# Patient Record
Sex: FEMALE | Race: WHITE
[De-identification: names, ages, dates, MRNs, and addresses within clinical notes are randomized per-mention and may not be internally consistent; named-entity substitution may affect disease eponyms.]

---

## 2017-02-05 ENCOUNTER — HOSPITAL ENCOUNTER (EMERGENCY)
Dept: HOSPITAL 25 - UCEAST | Age: 57
Discharge: HOME | End: 2017-02-05
Payer: COMMERCIAL

## 2017-02-05 VITALS — DIASTOLIC BLOOD PRESSURE: 81 MMHG | SYSTOLIC BLOOD PRESSURE: 122 MMHG

## 2017-02-05 DIAGNOSIS — Z88.2: ICD-10-CM

## 2017-02-05 DIAGNOSIS — J32.9: Primary | ICD-10-CM

## 2017-02-05 PROCEDURE — 99212 OFFICE O/P EST SF 10 MIN: CPT

## 2017-02-05 PROCEDURE — G0463 HOSPITAL OUTPT CLINIC VISIT: HCPCS

## 2017-02-05 NOTE — UC
Psychiatric Complaint HPI





- HPI Summary


HPI Summary: 





sinus congestion/cough/green nasal drainage and sputum





- History Of Current Complaint


Chief Complaint: UCRespiratory


Stated Complaint: ASTHMA FEVER EAR PAIN CONGESTION


Time Seen by Provider: 02/05/17 11:24


Hx Obtained From: Patient


Hx Last Menstrual Period: 4/16/13


Pregnant?: No


Onset/Duration: Gradual Onset, Lasting Days, Still Present


Timing: Minutes - few days


Severity Initially: Mild


Severity Currently: Moderate


Aggravating Factor(s): Nothing


Alleviating Factor(s): Nothing


Associated Signs And Symptoms: Negative





- Allergies/Home Medications


Allergies/Adverse Reactions: 


 Allergies











Allergy/AdvReac Type Severity Reaction Status Date / Time


 


Sulfa Drugs Allergy  Shortness Verified 02/05/17 10:48





   of Breath  














PMH/Surg Hx/FS Hx/Imm Hx


Previously Healthy: No


Endocrine History Of: 


   Denies: Diabetes, Thyroid Disease


Cardiovascular History Of: 


   Denies: Cardiac Disorders, Hypertension


Respiratory History Of: Reports: Asthma


   Denies: COPD


GI/ History Of: 


   Denies: Ulcer


Cancer History Of: 


   Denies: Breast Cancer





- Surgical History


Surgical History: Yes


Surgery Procedure, Year, and Place: appY.  csection x3.  em.  acl-left.  

TUBAL





- Family History


Known Family History: Positive: Hypertension, Respiratory Disease





- Social History


Occupation: Employed Full-time


Lives: With Family


Alcohol Use: Occasionally


Substance Use Type: None


Smoking Status (MU): Never Smoked Tobacco





- Immunization History


Most Recent Influenza Vaccination: 2016


Most Recent Pneumonia Vaccination: 2016





Review of Systems


Constitutional: Chills, Fatigue


Skin: Negative


Eyes: Negative


ENT: Sore Throat, Ear Ache, Nasal Discharge


Respiratory: Cough


Cardiovascular: Negative


Gastrointestinal: Negative


Genitourinary: Negative


Motor: Negative


Neurovascular: Negative


Musculoskeletal: Negative


Neurological: Negative


Psychological: Negative


All Other Systems Reviewed And Are Negative: Yes





Physical Exam


Triage Information Reviewed: Yes


Appearance: Well-Appearing, No Pain Distress, Well-Nourished


Vital Signs: 


 Initial Vital Signs











Temp  99.3 F   02/05/17 10:43


 


Pulse  93   02/05/17 10:43


 


Resp  18   02/05/17 10:43


 


BP  122/81   02/05/17 10:43


 


Pulse Ox  100   02/05/17 10:43











Vital Signs Reviewed: Yes


Eye Exam: Normal


Eyes: Positive: Conjunctiva Clear


ENT Exam: Normal


ENT: Positive: Hearing grossly normal, Pharynx normal, Pharyngeal erythema, 

Nasal congestion, Nasal drainage.  Negative: TMs normal, Tonsillar swelling, 

Tonsillar exudate, Trismus, Muffled/hoarse voice


Dental Exam: Normal


Neck exam: Normal


Neck: Positive: Supple, Nontender, No Lymphadenopathy


Respiratory Exam: Normal


Respiratory: Positive: Chest non-tender, Lungs clear, Normal breath sounds, No 

respiratory distress, No accessory muscle use


Cardiovascular Exam: Normal


Cardiovascular: Positive: RRR, No Murmur, Pulses Normal, Brisk Capillary Refill


Musculoskeletal Exam: Normal


Musculoskeletal: Positive: Strength Intact, ROM Intact, No Edema


Neurological Exam: Normal


Neurological: Positive: Alert, Muscle Tone Normal


Psychological Exam: Normal


Skin Exam: Normal


Skin: Positive: rashes





Psych Complaint Course/Dx





- Course


Course Of Treatment: Augmentin, flonase increase fluids, follow with pcp re-

check prn





- Differential Dx/Diagnosis


Differential Diagnosis/HQI/PQRI: Other - sinusitis, uri , influenza,


Provider Diagnoses: Sinusitis





Discharge





- Discharge Plan


Condition: Stable


Disposition: HOME


Prescriptions: 


Amoxicillin/Clavulanate TAB* [Augmentin *] 875 mg PO BID #20 tab


Fluticasone NASAL SPRAY 50MCG* [Flonase NASAL SPRAY 50MCG*] 2 spray BOTH NARES 

DAILY #1 btl


Patient Education Materials:  Amoxicillin/Clavulanate Potassium (By mouth), 

Fluticasone (Into the nose)


Referrals: 


Romana Hughes MD [Primary Care Provider] - If Needed

## 2017-03-20 ENCOUNTER — HOSPITAL ENCOUNTER (EMERGENCY)
Dept: HOSPITAL 25 - ED | Age: 57
Discharge: HOME | End: 2017-03-20
Payer: COMMERCIAL

## 2017-03-20 VITALS — DIASTOLIC BLOOD PRESSURE: 67 MMHG | SYSTOLIC BLOOD PRESSURE: 105 MMHG
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
## 2017-03-20 DIAGNOSIS — J45.909: ICD-10-CM

## 2017-03-20 DIAGNOSIS — T78.40XA: Primary | ICD-10-CM

## 2017-03-20 DIAGNOSIS — Z88.2: ICD-10-CM

## 2017-03-20 PROCEDURE — 99282 EMERGENCY DEPT VISIT SF MDM: CPT

## 2017-03-20 PROCEDURE — 96375 TX/PRO/DX INJ NEW DRUG ADDON: CPT

## 2017-03-20 PROCEDURE — 96374 THER/PROPH/DIAG INJ IV PUSH: CPT

## 2017-03-20 NOTE — ED
Wellington LUNA,Karey, scribed for Elroy Linn MD on 03/20/17 at 0054 .





Allergic Reaction/Systemic





- HPI Summary


HPI Summary: 





This 57 y/o female presents to ED for possible allergic reaction and diffuse 

itchy rash this 2130 PM. She is seen actively scratching all over at time of 

initial evaluation. Pt reports that this episode feels similar to her previous 

allergic reaction to sulfa drugs, but denies taking any different medications 

that could have caused this reaction. Negative SOB, or difficulty swallowing. 

Children's benadryl PTA with little relief. PMHx includes asthma. 





Pt requests IV administration of antihistamine for faster relief. 








- History of Current Complaint


Hx Obtained From: Patient, Medical Records


Hx Last Menstrual Period: 4/16/13


Onset/Duration: Sudden Onset


Timing: Constant


Pain Intensity: 8


Pain Scale Used: 0-10 Numeric


Location: Diffuse


Aggravating Factor(s): Nothing


Alleviating Factor(s): Nothing


Associated Signs And Symptoms: Positive: Rash - itchy





- Allergies/Home Medications


Allergies/Adverse Reactions: 


 Allergies











Allergy/AdvReac Type Severity Reaction Status Date / Time


 


Sulfa Drugs Allergy  Shortness Verified 03/20/17 01:36





   of Breath  














PMH/Surg Hx/FS Hx/Imm Hx


Endocrine/Hematology History: 


   Denies: Hx Diabetes, Hx Thyroid Disease


Cardiovascular History: 


   Denies: Hx Hypertension


Respiratory History: Reports: Hx Asthma


   Denies: Hx Chronic Obstructive Pulmonary Disease (COPD)


GI History: 


   Denies: Hx Ulcer





- Cancer History


Hx Chemotherapy: No


Hx Radiation Therapy: No





- Surgical History


Surgery Procedure, Year, and Place: appY.  csection x3.  em.  acl-left.  

TUBAL


Infectious Disease History: No


Infectious Disease History: 


   Denies: Hx Clostridium Difficile, Hx Hepatitis, Hx Human Immunodeficiency 

Virus (HIV), Hx of Known/Suspected MRSA, Hx Shingles, Hx Tuberculosis, Hx Known/

Suspected VRE, Hx Known/Suspected VRSA, History Other Infectious Disease, 

Traveled Outside the [place] in Last 30 Days





- Family History


Known Family History: Positive: Hypertension, Respiratory Disease





- Social History


Lives: With Family


Alcohol Use: Occasionally


Substance Use Type: Reports: None


Hx Tobacco Use: No


Smoking Status (MU): Never Smoked Tobacco





Review of Systems


Negative: Fever


Negative: Other - difficulty swallowing


Negative: Shortness Of Breath


Positive: Rash - itchy rash


Negative: Anxious, Depressed


All Other Systems Reviewed And Are Negative: Yes





Physical Exam


Triage Information Reviewed: Yes


Vital Signs On Initial Exam: 


 Initial Vitals











Temp Pulse Resp BP Pulse Ox


 


 98.1 F   87   18   134/86   100 


 


 03/20/17 00:39  03/20/17 00:39  03/20/17 00:39  03/20/17 00:39  03/20/17 00:39











Vital Signs Reviewed: Yes


Appearance: Positive: Well-Appearing, No Pain Distress


Skin: Positive: Other - itchy rash diffuse across trunk. Pt is actively 

scratching.


Neck: Positive: Supple, Nontender


Respiratory/Lung Sounds: Positive: Clear to Auscultation, Breath Sounds Present

, Other - Airway patent


Musculoskeletal: Positive: Strength/ROM Intact


Neurological: Positive: Sensory/Motor Intact, Alert, Oriented to Person Place, 

Time


Psychiatric: Positive: Affect/Mood Appropriate


AVPU Assessment: Alert





Diagnostics





- Vital Signs


 Vital Signs











  Temp Pulse Resp BP Pulse Ox


 


 03/20/17 00:39  98.1 F  87  18  134/86  100














- Laboratory


Lab Statement: Any lab studies that have been ordered have been reviewed, and 

results considered in the medical decision making process.





Allergic Reaction Course/Dx





- Course


Assessment/Plan: IMROVED IN ED. NO AIRWAY INVOLVEMENT. DISCHARGE HOME STABLE.





- Diagnoses


Provider Diagnoses: 


 Allergic reaction








Discharge





- Discharge Plan


Condition: Stable


Disposition: HOME


Patient Education Materials:  Allergies (ED)


Referrals: 


Romana Hughes MD [Primary Care Provider] - 


Additional Instructions: 


FOLLOW UP WITH YOUR DOCTOR.


RETURN TO THE EMERGENCY DEPARTMENT FOR ANY WORSENING OF YOUR CONDITION OR 

QUESTIONS OR CONCERNS.





The documentation as recorded by the Wellington manning Soohyun accurately reflects the 

service I personally performed and the decisions made by me, Elroy Linn MD.

## 2018-02-25 ENCOUNTER — HOSPITAL ENCOUNTER (EMERGENCY)
Dept: HOSPITAL 25 - UCEAST | Age: 58
Discharge: HOME | End: 2018-02-25
Payer: COMMERCIAL

## 2018-02-25 VITALS — DIASTOLIC BLOOD PRESSURE: 73 MMHG | SYSTOLIC BLOOD PRESSURE: 107 MMHG

## 2018-02-25 DIAGNOSIS — Z88.2: ICD-10-CM

## 2018-02-25 DIAGNOSIS — J45.909: Primary | ICD-10-CM

## 2018-02-25 PROCEDURE — 99212 OFFICE O/P EST SF 10 MIN: CPT

## 2018-02-25 PROCEDURE — G0463 HOSPITAL OUTPT CLINIC VISIT: HCPCS

## 2018-02-25 NOTE — XMS REPORT
Lindsey Shields

 Created on:February 15, 2018



Patient:Lindsey Shields

Sex:Female

:1960

External Reference #:[id number]





Demographics







 Address  [address] [place] [address]

 

 Home Phone  [phone]

 

 Mobile Phone  [phone]

 

 Work Phone  [phone]

 

 Preferred Language  English

 

 Marital Status  Not  Or 

 

 Uatsdin Affiliation  Unknown

 

 Race  White

 

 Ethnic Group  Not  Or 









Author







 Organization  [facility]

 

 Address  [address] [place] [address]

 

 Phone  [phone]









Support







 Name  Relationship  Address  Phone

 

 Roderick Shields    Unavailable  [phone]









Care Team Providers







 Name  Role  Phone

 

 Shannon Hughes  Primary Care Physician  Unavailable









Payers







 Type  Date  Identification Numbers  Payment Provider  Subscriber

 

 Commercial  Effective:  Policy Number: [id number]  [place]andy  Lindsey Shields



   2010    h   Par  









 PayID: [id number]   [address]









 [place] [address]







Problems







 Date  Description  Provider  Status

 

 Onset: 2009  Endometriosis of uterus  Bea Carter M.D.  Active







Family History







 Date  Family Member(s)  Problem(s)  Comments

 

   Father  Melanoma  

 

   Father  Parkinson's Disease  

 

   Mother  Colon Cancer  

 

   Mother  Osteoporosis  

 

   Number of Children  3  

 

   First Son  A&amp;W  

 

   Second Son  A&amp;W  

 

   First Daughter  A&amp;W  

 

   Number of Siblings  Siblings: 2  

 

   First Brother  A&amp;W  

 

   First Sister  A&amp;W  

 

   Paternal Grandfather   due to Old Age  ()

 

   Paternal Grandmother  Rheumatoid Arthritis  

 

   Paternal Grandmother   due to Unknown Causes  ()

 

   Maternal Grandfather   due to Heart Disease  ()

 

   Maternal Grandmother   due to Heart Disease  ()







Social History







 Type  Date  Description  Comments

 

 Education    Highest level of education  



     completed is a master's  



     degree  

 

 Marital Status    Patient is   

 

 Living Situation    Lives with spouse  

 

 Diet    Diet is healthy and well  



     balanced  

 

 Sleep    Typically sleeps 7 hours a  



     night  

 

 Pets    Household pets include a dog  

 

 Occupation    Professor  AT , interior design

 

 Cigarette Use    Never smoked cigarettes  

 

 Alcohol    Drinks alcohol occasionally  

 

 Smoking    Patient has never smoked  

 

 Drug Use    Denies drug use  

 

 Daily Caffeine    Does not consume caffeine  

 

 Exercise Type/Frequency    Exercises regularly  



 Current      

 

 Seat Belt/Car Seat    Always uses a seat belt  

 

 Currently Active    The patient is currently  



     sexually active  

 

 Contraceptive Methods    Current methods of birth  



     control used include tubal  



     ligation  

 

 STD's    No STD history  







Allergies, Adverse Reactions, Alerts







 Date  Description  Reaction  Status  Severity  Comments

 

 2005  Sulfa    active    







Medications







 Medication  Date  Status  Form  Strength  Qnty  SIG  Indications  Ordering



                 Provider

 

 Progesterone  02/15/  Active  Capsules  100mg  90cap  1 by mouth    Ross ZHOU



 Micronized          s  every day    ELIER Mora

 

 Estradiol  02/15/  Active  Tablets  0.5mg  90tab  1 by mouth    Ross ZHOU



   2018        s  every day    ELIER Mora

 

 Levocetirizine  /  Active            Unknown



 Dihydrochloride  0000              

 

 Multivitamins  /  Active            Unknown



   0000              

 

 Singulair  /  Active            Unknown



   0000              

 

 Flovent Diskus  /  Active            Unknown



   0000              

 

 Zoloft  /  Active            Unknown



   0000              

 

                 

 

 Macrobid  /  Hx  Capsules  100mg  14cap  1 cap by    Katelyn



    -        s  mouth every    Jump,



   /          12 hours    ANP-C



             for 7 days    

 

 Combipatch  06/15/  Hx  Patches  0.05-0.14  24uni  Apply One    Katelyn



    -    Biweek  mg/Day  ts  Patch Twice    Jump,



   02/15/          Weekly    ANP-C



                 

 

 Combipatch  /  Hx  Patches  0.05-0.14  24uni  change 2x    Katelyn



    -    Biweek  mg/Day  ts  qw    Jump,



   /              ANP-C



                 

 

 Diflucan  /  Hx  Tablets  150mg  2tabs  1 by mouth    Katelyn



    -          times 1 day    Jump,



   /          can repeat    ANP-C



             in 48 hour    



             if needed    

 

 Prempro  /  Hx  Tablets  0.45-1.5m  84tab  take 1  N95.1  Katelyn



    -      g  s  tablet by    Jump,



   /          mouth one    ANP-C



             time daily    

 

 Prempro  10/22/  Hx  Tablets  0.45-1.5m  84tab  take 1  N95.1  Katelyn



    -      g  s  tablet by    Jump,



   /          mouth one    ANP-C



   2016          time daily    

 

 Metrogel-Vaginal  /  Hx  Gel  0.75%  1TX  1    Katelyn



    -          applicator    Jump,



   /          every night    ANP-C



             at bedtime    



             x 5 days    

 

 Diflucan  /  Hx  Tablets  150mg  1tabs  1 by mouth  616.10  Katelyn



   2015 -          times 1 day    Jump,



   /              ANP-C



                 

 

 Nystatin-Triamcin  /  Hx  Cream  295331-3.  30gra  apply three  616.10  
Katelyn



 olone  2015 -      1Unit/GM-  m  times a day    Jump,



   /      %    to vulva x    ANP-C



             5 days and    



             as needed    

 

 Prometrium  /  Hx  Capsules  100mg  90cap  take one    Katelyn



    -        s  capsule by    Jump,



   /          mouth at    ANP-C



             bedtime    

 

 Prometrium  /  Hx  Capsules  100mg  1caps  needs to    Katelyn



   2013 -          call office    Jump,



   /              ANP-C



                 

 

 Vagifem  10/15/  Hx  Tablets  10mcg  24tab  use 1  627.3  Katelyn



    -        s  tablet 2x    Jump,



   /          qw    ANP-C



                 

 

 Primrose  10/09/  Hx            Katelyn



   2013 -              Jump,



   /              ANP-C



                 

 

 Diflucan  /  Hx  Tablets  150mg  1tabs  1 po times    Katelyn



   2012 -          1 day    Jump,



   /              ANP-C



                 

 

 Macrobid  /  Hx  Capsules  100mg  10cap  1 po bid  599.89  Katelyn



    -        s      Jump,



   /              ANP-C



                 

 

 Ortho Tri-Cyclen  /  Hx  Tablets    1PK      CECILIA Magana   -              Rebekah



   /              ELIRE CALL



                 

 

 Detrol La  /  Hx  Caps ER  4mg  30cap  1 PO qd    CECILIA Ash



    -    24HR    s      Rebekah



   /              ELIER CALL



                 

 

 Macrobid  /  Hx  Capsules  100mg  10cap  1 PO bid    CECILIA Ash



    -        s      Rebekah



   /              ELIER CALL



                 

 

 Xyzal  /00/  Hx            Unknown



   2012              

 

 Flovent HFA  /00/  Hx            Unknown



   2012              

 

 Black Cohosh  /00/  Hx            Unknown



   2016              

 

 Patanase  00/00/  Hx            Unknown



    -              



   10/09/              



   2012              

 

 Doxycycline  /00/  Hx            Unknown



    -              



   2011              

 

 Dulera  /00/  Hx        prn    Unknown



   2015              

 

 Fish Oil  00/00/  Hx            Unknown



   2014              

 

 Progesterone  /  Hx            Unknown



   2016              

 

 Vitamin B Complex  /  Hx            Unknown



   2016              

 

 Menopause Formula  /  Hx            Unknown



   2016              







Vital Signs







 Date  Vital  Result  Comment

 

 02/15/2018  BP Systolic  116 mmHg  









 BP Diastolic  70 mmHg  

 

 Height  63.5 inches  5'3.50"

 

 Weight  122.00 lb  

 

 BMI (Body Mass Index)  21.3 kg/m2  

 

   4  

 

 Parity  3  









 10/31/2017  BP Systolic  104 mmHg  









 BP Diastolic  68 mmHg  

 

 Body Temperature  97.8 F  

 

 Height  63.5 inches  5'3.50"

 

 Weight  127.00 lb  

 

 BMI (Body Mass Index)  22.1 kg/m2  

 

 Last Menstrual Period  8198631  

 

   4  

 

 Parity  3  









 2017  BP Systolic  108 mmHg  









 BP Diastolic  70 mmHg  

 

 Height  63.5 inches  5'3.50"

 

 Weight  125.00 lb  

 

 BMI (Body Mass Index)  21.8 kg/m2  

 

 Last Menstrual Period  5499917  

 

   4  

 

 Parity  3  









 2017  BP Systolic  110 mmHg  









 BP Diastolic  68 mmHg  

 

 Height  63.5 inches  5'3.50"

 

 Weight  125.00 lb  

 

 BMI (Body Mass Index)  21.8 kg/m2  

 

 Last Menstrual Period  6838219  

 

   4  

 

 Parity  3  









 2017  BP Systolic  128 mmHg  









 BP Diastolic  72 mmHg  

 

 Height  63.5 inches  5'3.50"

 

 Weight  124.00 lb  

 

 BMI (Body Mass Index)  21.6 kg/m2  

 

 Last Menstrual Period  5939804  

 

   4  

 

 Parity  3  









 2016  BP Systolic  100 mmHg  









 BP Diastolic  76 mmHg  

 

 Height  63.5 inches  5'3.50"

 

 Weight  130.00 lb  

 

 BMI (Body Mass Index)  22.7 kg/m2  

 

 Last Menstrual Period  9722622  

 

   4  

 

 Parity  3  









 2016  BP Systolic  116 mmHg  









 BP Diastolic  82 mmHg  

 

 Height  63.5 inches  5'3.50"

 

 Weight  130.00 lb  

 

 BMI (Body Mass Index)  22.7 kg/m2  

 

 Last Menstrual Period  7737253  

 

   4  

 

 Parity  3  









 2016  BP Systolic  122 mmHg  









 BP Diastolic  70 mmHg  

 

 Height  63.5 inches  5'3.50"

 

 Weight  128.00 lb  

 

 BMI (Body Mass Index)  22.3 kg/m2  

 

 Last Menstrual Period  2775164  

 

   4  

 

 Parity  3  









 10/22/2015  BP Systolic  106 mmHg  









 BP Diastolic  66 mmHg  

 

 Height  63.5 inches  5'3.50"

 

 Weight  126.00 lb  

 

 BMI (Body Mass Index)  22.0 kg/m2  

 

   4  

 

 Parity  3  









 2015  BP Systolic  104 mmHg  









 BP Diastolic  66 mmHg  

 

 Height  63.50 inches  5'3.50"

 

 Weight  126.00 lb  

 

 BMI (Body Mass Index)  22.0 kg/m2  

 

 Last Menstrual Period  3712053  

 

   4  

 

 Parity  3  









 10/20/2014  BP Systolic  102 mmHg  









 BP Diastolic  70 mmHg  

 

 Height  63.50 inches  5'3.50"

 

 Weight  122.00 lb  

 

 BMI (Body Mass Index)  21.3 kg/m2  

 

 Last Menstrual Period  5424483  

 

   4  

 

 Parity  3  









 2014  BP Systolic  112 mmHg  









 BP Diastolic  72 mmHg  

 

 Height  63 inches  5'3"

 

 Weight  122.00 lb  

 

 BMI (Body Mass Index)  21.6 kg/m2  

 

 Last Menstrual Period  3844849  

 

   4  

 

 Parity  3  









 2013  BP Systolic  110 mmHg  









 BP Diastolic  70 mmHg  

 

 Height  63 inches  5'3"

 

 Weight  122.00 lb  

 

 BMI (Body Mass Index)  21.6 kg/m2  

 

 Last Menstrual Period  6317985  

 

   4  

 

 Parity  3  









 10/15/2013  BP Systolic  118 mmHg  









 BP Diastolic  76 mmHg  

 

 Height  63 inches  5'3"

 

 Weight  125.00 lb  

 

 BMI (Body Mass Index)  22.1 kg/m2  

 

 Last Menstrual Period  4220309  

 

   4  

 

 Parity  3  









 10/09/2013  BP Systolic  122 mmHg  









 BP Diastolic  70 mmHg  

 

 Height  63 inches  5'3"

 

 Weight  123.00 lb  

 

 BMI (Body Mass Index)  21.8 kg/m2  

 

 Last Menstrual Period  6499281  

 

   4  

 

 Parity  3  









 2012  BP Systolic  100 mmHg  









 BP Diastolic  60 mmHg  

 

 Height  63 inches  5'3"

 

 Weight  121.00 lb  

 

 BMI (Body Mass Index)  21.4 kg/m2  

 

 Last Menstrual Period  2533653  

 

   4  

 

 Parity  3  









 2012  BP Systolic  112 mmHg  









 BP Diastolic  74 mmHg  

 

 Height  63 inches  5'3"

 

 Weight  118.00 lb  

 

 BMI (Body Mass Index)  20.9 kg/m2  

 

 Last Menstrual Period  5810385  

 

   4  

 

 Parity  3  









 2012  BP Systolic  100 mmHg  









 BP Diastolic  60 mmHg  

 

 Height  63 inches  5'3"

 

 Weight  119.00 lb  

 

 BMI (Body Mass Index)  21.1 kg/m2  

 

 Last Menstrual Period  1194037  

 

   4  

 

 Parity  3  









 2012  BP Systolic  120 mmHg  









 BP Diastolic  72 mmHg  

 

 Height  62.75 inches  5'2.75"

 

 Weight  122.00 lb  

 

 BMI (Body Mass Index)  21.8 kg/m2  

 

 Last Menstrual Period  2046150  

 

   4  

 

 Parity  3  









 10/24/2011  BP Systolic  98 mmHg  









 BP Diastolic  58 mmHg  

 

 Height  63 inches  5'3"

 

 Weight  130.00 lb  

 

 BMI (Body Mass Index)  23.0 kg/m2  









 2011  BP Systolic  100 mmHg  









 BP Diastolic  70 mmHg  

 

 Height  63 inches  5'3"

 

 Weight  124.00 lb  

 

 BMI (Body Mass Index)  22.0 kg/m2  

 

 Last Menstrual Period  7956348  









 2010  BP Systolic  120 mmHg  









 BP Diastolic  60 mmHg  

 

 Height  63 inches  5'3"

 

 Weight  120.00 lb  

 

 BMI (Body Mass Index)  21.3 kg/m2  

 

 Last Menstrual Period  5856872  

 

   4  

 

 Parity  3  









 2009  BP Systolic  102 mmHg  









 BP Diastolic  62 mmHg  

 

 Height  63.5 inches  5'3.50"

 

 Weight  123.00 lb  

 

 BMI (Body Mass Index)  21.4 kg/m2  

 

 Last Menstrual Period  4001156  

 

   4  

 

 Parity  3  









 2008  BP Systolic  110 mmHg  









 BP Diastolic  72 mmHg  

 

 Height  63.5 inches  5'3.50"

 

 Weight  126.00 lb  

 

 BMI (Body Mass Index)  22.0 kg/m2  

 

 Last Menstrual Period  0589958  









 2006  BP Systolic  90 mmHg  









 BP Diastolic  60 mmHg  

 

 Height  63.5 inches  5'3.50"

 

 Weight  123.00 lb  

 

 BMI (Body Mass Index)  21.4 kg/m2  









 2006  BP Systolic  98 mmHg  









 BP Diastolic  68 mmHg  

 

 Height  63.5 inches  5'3.50"

 

 Last Menstrual Period  2665825  









 2006  BP Systolic  90 mmHg  









 BP Diastolic  60 mmHg  

 

 Height  63.5 inches  5'3.50"

 

 Weight  124.00 lb  

 

 BMI (Body Mass Index)  21.6 kg/m2  

 

 Last Menstrual Period  1006417  

 

   4  

 

 Parity  3  







Results







 Test  Date  Test  Result  H/L  Range  Note

 

 Urine Culture And  10/31/2017  Urine Culture  SEE RESULT BELOW      1



 Sensitivities            

 

 Laboratory test finding  2017  Surgical Pathology  SEE RESULT BELOW      
2

 

 Laboratory test finding  2017  Cytology  SEE RESULT BELOW      3









 Human Papilloma Virus Rna  Negative    Negative  4









 Laboratory test finding  2016  Culture Genital &amp;  SEE RESULT BELOW  
  yeast  5



     Sensitivity        

 

 Laboratory test finding  10/22/2015  Cytology  SEE RESULT BELOW      6









 Human Papilloma Virus Rna  Negative    Negative  7









 Laboratory test finding  2015  Culture Genital &amp;  SEE RESULT BELOW  
  BV  8



     Sensitivity        

 

 Thinprep W/Reflex HR HPV  10/20/2014  ThinPrep Pap Test reflex  (SEE NOTE)    
  9



 If Asc-US    HPV if Asc-US        









 ThinPrep Pap Test reflex HPV if Asc-US  SEE IMAGE      









 Thyroid Function Cascade  2013  Thyroid Stim Hormone  3.0 mIU/L    0.3-
5.0  10

 

 Laboratory test finding  2013  Follicle Stimulating  9.43 miu/mL      11



     Hormone        









 Luteinizing Hormone  3.28 miu/mL      12









 Laboratory test  2013  Surgical Pathology  RUN DATE: 11/11/    normal  13



 finding      &lt;SEE NOTE&gt;      

 

 Laboratory test  10/15/2013  Genital Culture  (SEE NOTE)      14



 finding            

 

 Human Papilloma  10/10/2013  Human Papillomavirus  See Comment      15



     Source        









 Human Papillomavirus High Risk  Negative    Negative  16









 Laboratory test  10/09/2013  Cytology  RUN DATE: 10/10/    neg/  17



 finding      &lt;SEE NOTE&gt;      

 

 Laboratory test  2012  Genital Culture  ----------------      18



 finding      &lt;SEE NOTE&gt;      

 

 Laboratory test  2011  Cytology  ----------------      19



 finding      &lt;SEE NOTE&gt;      

 

 Laboratory test  2010  Cytology  ----------------      20



 finding      &lt;SEE NOTE&gt;      

 

 Laboratory test  2009  Cytology  ----------------      21



 finding      &lt;SEE NOTE&gt;      

 

 Pathology  2006  Surgical Pathology  ----------------      22



       &lt;SEE NOTE&gt;      

 

 Pap Smear RFX High  2006  Diagnosis:  COMMENT      23, 24



 Risk To HPV            









 Specimen adequacy:  COMMENT      23, 25

 

 Clinician provided Icd9:  COMMENT      23, 26

 

 Performed by:  COMMENT      23, 27

 

 QC reviewed by:  COMMENT      23, 28

 

 .  .      23

 

 Note:  COMMENT      23, 29

 

 .  COMMENT      23, 30

 

 Chlamydia, Nuc. Acid Amp  Negative    Negative  23

 

 Gonococcus, Nuc. Acid Amp  Negative    Negative  23









 1  SEE RESULT BELOW



   -----------------------------------------------------------------------------
---------------



   Name:  LINDSEY SHIELDS                : 1960    Attend Dr: [facility] [place]



   Acct:  [id number]  Unit: [id number]  AGE: 57            Location:  [facility]



   Reg:   10/31/17                        SEX: F             Status:    REG REF



   -----------------------------------------------------------------------------
---------------



   



   SPEC: [id number]         MILAGROS:   10/31/    [facility] DR: [facility]



   REQ:  [id number]              RECD:   10/31/



   STATUS: COMP



   _



   SOURCE: URINE          SPDESC:



   ORDERED:  Urine Culture



   COMMENTS: [id number]



   Urine Source: Random



   



   -----------------------------------------------------------------------------
---------------



   Procedure                         Result                         Reported   
        Site



   -----------------------------------------------------------------------------
---------------



   Urine Culture  Final                                             17-
0825      ML



   



   Organism 1                     ESCHERICHIA COLI



   [place] Count                &gt;100,000 (Many) CFU/ML



   



   1. ESCHERICHIA COLI



   M.I.C.      RX



   --------- ------



   Ampicillin                     &lt;=2         S



   Cefazolin                      &lt;=4         S



   Cefepime                       &lt;=1         S



   Ceftriaxone                    &lt;=1         S



   Ciprofloxacin                  &lt;=0.25      S



   Gentamicin                     &lt;=1         S



   Levofloxacin                   &lt;=0.12      S



   Meropenem                      &lt;=0.25      S



   Nitrofurantoin                 &lt;=16        S



   Tetracycline                   &lt;=1         S



   Pipercillin/Tazobactam         &lt;=4         S



   Trimethoprim/Sulfamethoxazole  &lt;=20        S



   Amoxicillin/Clavulanic Acid    &lt;=2         S



   Aztreonam                      &lt;=1         S



   



   



   Contact the Microbiology Department for any additional



   antibiotic reporting.



   



   -----------------------------------------------------------------------------
---------------



   * ML - MAIN LAB ([facility])



   .



   



   ** END OF REPORT **



   



   * ML=Testing performed at Main Lab



   DEPARTMENT OF PATHOLOGY,  [address] [place] [address]



   Phone # [phone]      Fax #[phone]



   Everett Conde M.D. Director     [facility] # [id number]

 

 2  SEE RESULT BELOW



   -----------------------------------------------------------------------------
---------------



   Name:  LINDSEY SHIELDS                : 1960    Attend Dr: Katelyn LEDESMA



   Acct:  [id number]  Unit: [id number]  AGE: 56            Location:  [facility]



   Re17                        SEX: F             Status:    REG REF



   -----------------------------------------------------------------------------
---------------



   



   SPEC: [id number]             MILAGROS:       [facility] DR: Katelyn LEDESMA



   REQ:  [id number]             RECD: 



   STATUS: SOUT



   _



   ORDERED:  LEVEL 4



   COMMENTS: [id number]



   



   FINAL DIAGNOSIS



   



   



   Uterus, endometrium, biopsy:



   -- Inactive endometrial mucosa.



   -- No evidence of hyperplasia or malignancy.



   



   



   



   PRE-OPERATIVE DIAGNOSIS



   



   Dysfunctional uterine bleeding.



   



   GROSS DESCRIPTION



   



   The specimen is received in formalin labeled, EM BX, and consists of a 0.6 x 
0.4 x 0.1 cm



   aggregate of tan-pink irregular soft tissue fragments and red-brown blood 
clot.  The



   specimen is filtered and submitted entirely in one cassette.



   



   Signed __________(signature on file)___________ Shannon Kan MD  [id number]



   



   -----------------------------------------------------------------------------
---------------



   



   



   



   



   



   



   



   



   



   



   



   



   



   



   ** END OF REPORT **



   



   * ML=Testing performed at Main Lab



   DEPARTMENT OF PATHOLOGY,  [address] [place] [address]



   Phone # [phone]      Fax #[phone]



   Everett Conde M.D. Director     [facility] # [id number]

 

 3  SEE RESULT BELOW



   -----------------------------------------------------------------------------
---------------



   Name:  LINDSEY SHIELDS                : 1960    Attend Dr: Katelyn LEDESMA



   Acct:  [id number]  Unit: [id number]  AGE: 56            Location:  [facility]



   Re17                        SEX: F             Status:    REG REF



   -----------------------------------------------------------------------------
---------------



   



   SPEC: [id number]            MILAGROS:       SUBM DR: Katelyn LEDESMA



   REQ:  [id number]             RECD: 



   STATUS: SOUT



   _



   ORDERED:  TP IMAGE ANAL, HPV/Thin Prep



   COMMENTS: [id number]



   



   FINAL DIAGNOSIS



   



   Negative for Intraepithelial lesion or Malignancy



   A. Ectocervical/Endocervical



   Specimen Adequacy:



   Satisfactory of evaluation



   Transformation zone component identified



   Patient Information:



   HPV: High risk HPV RNA testing regardless of pap results.



   Actual Specimen Date:         17



   Last Menstrual Date:          17



   Date of Last Specimen:        10/22/15



   



   -----------------------------------------------------------------------------
---------------



   Date     Time Test            Result   Flag (u) Normal Range



   17 0953 HPV RNA         Negative          Negative



   



   The high-risk HPV types detected by the assay include: 16,



   18, 31, 33, 35, 39, 45, 51, 52, 56, 58, 59, 66, and 68.



   -----------------------------------------------------------------------------
---------------



   



   



   Signed __________(signature on file)___________ ALEXEI Greene (ASC)  4516



   



   This Pap test was evaluated with the assistance of the [employer] Test Imaging 
System. Due to



   cytologic findings at the imager microscope, comprehensive manual 
rescreening by a



   Cytotechnologist may be required.



   The Pap Smear is a screening test designed to aid in the detection of 
premalignant and



   malignant conditions of the uterine cervix.  It is not a diagnostic 
procedure and should



   not be used as the sole means of detecting cervical cancer.  Both false-
positive and false-



   negative reports do occur.  Depending on your risk status, a Pap smear 
should be obtained



   and evaluated every 1-3 years.



   



   -----------------------------------------------------------------------------
---------------



   



   ** END OF REPORT **



   



   * ML=Testing performed at Main Lab



   DEPARTMENT OF PATHOLOGY,  [address] [place] [address]



   Phone # [phone]      Fax #[phone]



   Everett Conde M.D. Director     [facility] # [id number]

 

 4  The high-risk HPV types detected by the assay include: 16,



   18, 31, 33, 35, 39, 45, 51, 52, 56, 58, 59, 66, and 68.

 

 5  SEE RESULT BELOW



   -----------------------------------------------------------------------------
---------------



   Name:  MIKE SHIELDSA R                : 1960    Attend Dr: Katelyn Key CNP



   Acct:  [id number]  Unit: [id number]  AGE: 55            Location:  [facility]



   Re16                        SEX: F             Status:    REG REF



   -----------------------------------------------------------------------------
---------------



   



   SPEC: [id number]         MILAGROS:       [facility] DR: Katelyn Key 
CNP



   REQ:  [id number]              RECD:   



   STATUS: COMP



   _



   SOURCE: CERVIX         SPDESC:



   ORDERED:  Genital Culture



   



   -----------------------------------------------------------------------------
---------------



   Procedure                         Result                         Reported   
        Site



   -----------------------------------------------------------------------------
---------------



   Genital Culture  Final                                           16-
1129      ML



   



   Organism 1                     YEAST



   Quantity                    3+



   Organism 2                     NORMAL ROBERT



   Quantity                    1+



   



   Routine genital cultures do not include selective agar for



   Neisseria gonorrhoeae. Molecular testing offers better test



   sensitivity and therefore is the preferred test methodology



   for identifying this organism.



   



   -----------------------------------------------------------------------------
---------------



   * ML - [facility] ([facility])



   .



   



   



   



   



   



   



   



   



   



   



   



   



   



   



   



   



   



   ** END OF REPORT **



   



   * ML=Testing performed at Main Lab



   DEPARTMENT OF PATHOLOGY,  [address] [place] [address]



   Phone # [phone]      Fax #[phone]



   Everett Conde M.D. Director     [facility] # [id number]

 

 6  SEE RESULT BELOW



   -----------------------------------------------------------------------------
---------------



   Name:  LINDSEY SHIELDS                : 1960    Attend Dr: Katelyn Key CNP



   Acct:  [id number]  Unit: [id number]  AGE: 55            Location:  [facility]



   Reg:   10/22/15                        SEX: F             Status:    REG REF



   -----------------------------------------------------------------------------
---------------



   



   SPEC: [id number]            MILAGROS: 10/22/      SUBM DR: Katelyn Key 
CNP



   REQ:  [id number]             RECD: 10/22/



   STATUS: SOUT



   _



   ORDERED:  IMAGE ANALYSIS, HPV/Thin Prep



   



   FINAL DIAGNOSIS



   



   Negative for Intraepithelial lesion or Malignancy



   A. Ectocervical/Endocervical



   Specimen Adequacy:



   Satisfactory of evaluation



   Transformation zone component identified



   Patient Information:



   HPV: High risk HPV RNA testing regardless of pap results.



   Actual Specimen Date:         10/22/15



   LMP If Unknown:               2015



   Date of Last Specimen:        10/20/14



   



   -----------------------------------------------------------------------------
---------------



   Date     Time Test            Result   Flag (u) Normal Range



   10/22/15 1009 HPV RNA         Negative          Negative



   



   The high-risk HPV types detected by the assay include: 16,



   18, 31, 33, 35, 39, 45, 51, 52, 56, 58, 59, 66, and 68.



   -----------------------------------------------------------------------------
---------------



   



   



   Signed __________(signature on file)___________ ALEXEI Greene (ASC) 10/23
/15 1309



   



   This Pap test was evaluated with the assistance of the [employer] Test Imaging 
System. Due to



   cytologic findings at the imager microscope, comprehensive manual 
rescreening by a



   Cytotechnologist may be required.



   The Pap Smear is a screening test designed to aid in the detection of 
premalignant and



   malignant conditions of the uterine cervix.  It is not a diagnostic 
procedure and should



   not be used as the sole means of detecting cervical cancer.  Both false-
positive and false-



   negative reports do occur.  Depending on your risk status, a Pap smear 
should be obtained



   and evaluated every 1-3 years.



   



   -----------------------------------------------------------------------------
---------------



   



   



   ** END OF REPORT **



   



   * ML=Testing performed at Main Lab



   DEPARTMENT OF PATHOLOGY,  [address] [place] [address]



   Phone # [phone]      Fax #[phone]



   Everett Conde M.D. Director     [facility] # [id number]

 

 7  The high-risk HPV types detected by the assay include: 16,



   18, 31, 33, 35, 39, 45, 51, 52, 56, 58, 59, 66, and 68.

 

 8  SEE RESULT BELOW



   -----------------------------------------------------------------------------
---------------



   Name:  LINDSEY SHIELDS                : 1960    Attend Dr: Katelyn Key CNP



   Acct:  [id number]  Unit: [id number]  AGE: 54            Location:  [facility]



   Re/20/15                        SEX: F             Status:    REG REF



   -----------------------------------------------------------------------------
---------------



   SPEC: [id number]         MILAGROS:   08/20/    SUBM DR: Katelyn Key 
CNP



   REQ:  [id number]              RECD:   08/20/



   STATUS: COMP



   _



   SOURCE: VAGINAL        SPDESC:



   ORDERED:  Genital Culture



   -----------------------------------------------------------------------------
---------------



   Procedure                         Result                         Verified   
        Site



   -----------------------------------------------------------------------------
---------------



   Genital Culture  Final                                           08/22/15-
1049      ML



   Organism 1                     [place]ST[place] VAGINALIS - PRESUMPTIVE



   Quantity                    3+



   Organism 2                     NORMAL ROBERT



   Quantity                    2+



   Routine genital cultures do not include selective agar for



   Neisseria gonorrhoeae. Molecular testing offers better test



   sensitivity and therefore is the preferred test methodology



   for identifying this organism.



   -----------------------------------------------------------------------------
---------------



   * ML - MAIN LAB ([facility])



   .



   ** END OF REPORT **



   * ML=Testing performed at Main Lab



   DEPARTMENT OF PATHOLOGY,  [address] [place] [address]



   Phone # [phone]      Fax #[phone]



   Everett Conde M.D. Director     [facility] # [id number]

 

 9  ---------------------SPECIMEN PART-------------------



   A. Cervical, Endocervical, ThinPrep Pap (Imager)



   ---------------------CYTOLOGY HX---------------------



   Date of Last Menstrual Period: 10/12/14



   Other Information:Clinical Diagnosis Code: V76.2



   Previous Normal Pap: 10/9/13



   ---------------------FINAL DIAGNOSIS-----------------



   INTERPRETATION:   Negative for Intraepithelial Lesion or



   Malignancy.



   



   SPECIMEN ADEQUACY:Satisfactory for evaluation.



   Endocervical/transformation zone component present.

 

 10  Test Performed by:



   [address] [address]



   : Yadiel Lyman III, M.D.

 

 11  Normally menstruating females



   - Follicular phase                 3 - 9



   - Mid-cycle peak                   4 - 23



   - Luteal phase                     1 - 6



   Postmenopausal females            16 - 114

 

 12  Normally menstruating females



   - Follicular Phase                    1 - 18



   - Mid-Cycle Peak                     24 - 105



   - Luteal Phase                      0.6 - 20



   Postmenopausal females               15 - 62

 

 13  RUN DATE: 13               [facility] **LIVE**       
         PAGE    1



   RUN TIME: 8664             [address] [place] [address]   [address]



   Specimen Inquiry



   



   -----------------------------------------------------------------------------
---------------



   Name:  LINDSEY SHIELDS                : 1960    Attend Dr: Katelyn Key CNP



   Acct:  [id number]  Unit: [id number]  AGE: 53            Location:  [facility]



   Re13                        SEX: F             Status:    REG REF



   -----------------------------------------------------------------------------
---------------



   



   SPEC: [id number]             MILAGROS: 13-41      SUBM DR: Katelyn Key 
CNP



   REQ:  [id number]             RECD: 



   STATUS: SOUT



   _



   ORDERED:  LEVEL IV



   



   FINAL DIAGNOSIS



   



   Uterus, endometrium, curettage:



   A. Weakly proliferative endometrium with extensive glandular and stromal 
breakdown.



   B. No evidence of hyperplasia or neoplasia identified.



   



   



   



   



   



   



   PRE-OPERATIVE DIAGNOSIS



   



   Dysfunctional uterine bleeding



   



   GROSS DESCRIPTION



   



   The specimen is received in formalin labeled Lindsey Shields, Undesignated 
and consists of



   



   multiple portions of tan-red, focally mucoid tissue that is aggregate 
measures 0.3 x 0.3 x



   0.3 cm.  Submitted entirely, one cassette.



   



   



   Signed __________(signature on file)___________ Everett Conde MD  [id number]



   



   -----------------------------------------------------------------------------
---------------



   



   



   



   



   



   



   



   



   



   



   



   ** END OF REPORT **



   



   * ML=Testing performed at Main Lab



   DEPARTMENT OF PATHOLOGY,  [facility] [place], [place] [address]



   Phone # [phone]      Fax #[phone]



   Everett Conde M.D. Director     [place] Permit  #[id number]

 

 14  RUN DATE: 10/18/13               [facility] **LIVE**       
         PAGE    1



   RUN TIME: 912             [facility] [employer] [place], [place]   [address]



   Specimen Inquiry



   



   -----------------------------------------------------------------------------
---------------



   Name:  LINDSEY SHIELDS                : 1960    Attend Dr: Katelyn Key CNP



   Acct:  [id number]  Unit: [id number]  AGE: 52            Location:  [facility]



   Reg:   10/15/13                        SEX: F             Status:    REG REF



   -----------------------------------------------------------------------------
---------------



   



   SPEC: [id number]         MILAGROS:   10/15/13-    SUBM DR: Katelyn Key CNP



   REQ:  [id number]              RECD:   10/16/



   STATUS: COMP



   _



   SOURCE: CERVIX         SPDESC:



   ORDERED:  Genital Culture



   QUERIES:  [employer] Number [id number]



   



   -----------------------------------------------------------------------------
---------------



   Procedure                         Result                         Verified   
        Site



   -----------------------------------------------------------------------------
---------------



   Genital Culture  Final                                           10/18/13-
912      ML



   No Growth Day 2



   



   -----------------------------------------------------------------------------
---------------



   



   



   



   



   



   



   



   



   



   



   



   



   



   



   



   



   



   



   



   



   



   



   



   



   



   



   



   



   ** END OF REPORT **



   



   * ML=Testing performed at Main Lab



   DEPARTMENT OF PATHOLOGY,  [facility] [place], [place] [address]



   Phone # [phone]      Fax #[phone]



   Everett Conde M.D. Director     [place] Permit  #[id number]

 

 15  RESULT: Ectocervical/Endocervical

 

 16  For types 16, 18, 31, 33, 35, 39, 45, 51, 52, 56, 58, 59



   and 68.



   Test Performed by:



   [address] [address]



   : Yadiel Lyman III, M.D.

 

 17  RUN DATE: 10/10/13               [facility] **LIVE**       
         PAGE    1



   RUN TIME: 1217             [address] [place] [address]   [address]



   Specimen Inquiry



   



   -----------------------------------------------------------------------------
---------------



   Name:  LINDSEY SHIELDS                : 1960    Attend Dr: Katelyn Key CNP



   Acct:  [id number]  Unit: [id number]  AGE: 52            Location:  [facility]



   Reg:   10/09/13                        SEX: F             Status:    REG REF



   -----------------------------------------------------------------------------
---------------



   



   SPEC: [id number]            MILAGROS: 10/09/      SUBM DR: Katelyn Key 
CNP



   REQ:  [id number]             RECD: 10/09/



   STATUS: SOUT



   _



   ORDERED:  IMAGE ANALYSIS, HPV/Thin Prep



   



   FINAL DIAGNOSIS



   



   Negative for Intraepithelial lesion or Malignancy



   COMMENTS:



   Specimen sent to [facility] in [place], [place] on 10/10/13 by [id number] at 1209.



   Results will be reported separately.



   



   



   



   A. Ectocervical/Endocervical



   Specimen Adequacy:



   Satisfactory of evaluation



   Transformation zone component identified



   Patient Information:



   HPV: High risk HPV DNA testing regardless of pap results.



   Actual Specimen Date:         10/09/13



   Last Menstrual Date:          13



   Date of Last Specimen:        11



   



   Signed __________(signature on file)___________ ALEXEI Lima (ASCP) 
10/10/13 1217



   



   This Pap test was evaluated with the assistance of the [employer] Test Imaging 
System. Due to



   cytologic findings at the imager microscope, comprehensive manual 
rescreening by a



   Cytotechnologist may be required.



   The Pap Smear is a screening test designed to aid in the detection of 
premalignant and



   malignant conditions of the uterine cervix.  It is not a diagnostic 
procedure and should



   not be used as the sole means of detecting cervical cancer.  Both false-
positive and false-



   negative reports do occur.  Depending on your risk status, a Pap smear 
shoudl be obtained



   and evaluated every 1-3 years.



   



   -----------------------------------------------------------------------------
---------------



   



   



   



   



   ** END OF REPORT **



   



   * ML=Testing performed at Main Lab



   DEPARTMENT OF PATHOLOGY,  [address] [place] [address]



   Phone # [phone]      Fax #[phone]



   Everett Conde M.D. Director     [place] Permit  #[id number]

 

 18  ---------------------------------------------------------------------------
-----------------



   RUN DATE: 12               [facility] **LIVE**         
       PAGE 1



   RUN TIME: 1102                            Specimen Inquiry



   RUN USER: INTERFACE



   -----------------------------------------------------------------------------
---------------



   Name: LINDSEY SHIELDS                Acct#: [id number]      Status: REG REF  
   Re12



   Age/Sex: 51/F                         Unit#: [id number]       Location: [facility]



    : [birth year]



   -----------------------------------------------------------------------------
---------------



   



   SPEC #: [id number]      MILAGROS:      STATUS:  COMP           
REQ #: [id number]



   RECD:      SUBM DR: Katelyn Key CNP



   SOURCE: GENITAL            ENTR: 12-     LES DR:



   CHARO: SeeComment



   ORDERED:  GENITAL CULTURE



   COMMENTS: Specimen Description: vaginal swab



   QUERIES:  [employer] MEDICAL RECORD # [id number]



   [employer] REQUISITION # [id number]



   ACT WKST: B 12 #2



   -----------------------------------------------------------------------------
---------------



   Procedure                         Result                         Verified   
        Site



   -----------------------------------------------------------------------------
---------------



   &gt; GENITAL CULTURE  Final                                           -       ML



   



   Organism 1                     YEAST



   QUANTITY                    MODERATE



   Organism 2                     NORMAL ROBERT



   QUANTITY                    MANY



   



   -----------------------------------------------------------------------------
---------------



   ML - [facility] Permit #[id number]



   [address] [place] [address]        Ph: [phone]  Fax: [phone]



   



   



   



   



   



   



   



   



   



   



   



   



   



   



   



   



   



   



   



   



   



   



   



   -----------------------------------------------------------------------------
---------------



   DEPARTMENT OF PATHOLOGY, [address] [place] [address]



   Phone #[phone]   Fax #[phone]   [facility] Permit #[id number]



   ELIER Ortega M.D. 



   -----------------------------------------------------------------------------
---------------

 

 19  ---------------------------------------------------------------------------
----



   -------------



   



   RUN DATE: 11               [facility] **LIVE**



   PAGE 1



   RUN TIME: 1133                            Specimen Inquiry



   RUN USER: INTERFACE



   -----------------------------------------------------------------------------
--



   -------------



   



   Name: LINDSEY SHIELDS                Acct#: [id number]      Status: REG REF



   Re11



   Age/Sex: 50/F                         Unit#: [id number]       Location: [facility]



    : [birth year]



   -----------------------------------------------------------------------------
--



   -------------



   



   



   Specimen: [id number]   NINO   Spec Date: 11        David Dr: Marilyn Blanchard



   Spec Type: CYTOLOGY            Received:     Copies to:



   



   



   SOURCE



   



   ECTOCERVICAL/ENDOCERVICAL Thin Prep with Reflex HPV Test



   



   



   



   PATIENT INFORMATION



   



   ACTUAL COLLECTION DATE: 11



   



   



   LAST MENSTRUAL PERIOD:  11



   



   



   DATE OF PRIOR SPECIMEN:   11



   



   



   



   ADEQUACY OF SPECIMEN



   



   Satisfactory for evaluation  *



   



   



   Transformation zone component identified  *



   



   



   



   *******DIAGNOSIS*******



   



   NEGATIVE FOR INTRAEPITHELIAL LESION OR MALIGNANCY  *



   



   



   



   ******************************



   



   This Pap test was evaluated with the assistance of the



   ThinPrep Pap Test Imaging System.



   



   ******************************



   



   The Pap Smear is a screening test designed to aid in the detection of 
premalign



   ant and



   



   malignant conditions of the uterine cervix.  It is not a diagnostic 
procedure a



   nd should



   



   not be used as the sole means of detecting cervical cancer.  Both false-
positiv



   e and



   



   false-negative reports do occur. Depending on your risk status, a Pap smear 
itzel



   uld be



   



   obtained and evaluated every one to three years.



   



   Initial evaluation performed by    SANAZ ROWE([facility]) 11



   



   



   Final Interpretation electronically signed by: SANAZ ROWE([facility]) 11 
1131



   



   



   -----------------------------------------------------------------------------
--



   -------------



   



   



   



   



   



   -----------------------------------------------------------------------------
--



   -------------



   



   DEPARTMENT OF PATHOLOGY, [address] [place] [address]



   Phone #[phone]   Fax #[phone]   [facility] Permit #[id number]



   010



   Everett Conde M.D. Director   Darwin Gilmore M.D. Assistant Dir



   katey



   -----------------------------------------------------------------------------
--



   -------------

 

 20  ---------------------------------------------------------------------------
----



   -------------



   



   RUN DATE: 08/18/10               [facility] **LIVE**



   PAGE 1



   RUN TIME: 1150                            Specimen Inquiry



   RUN USER: INTERFACE



   -----------------------------------------------------------------------------
--



   -------------



   



   Name: MIKE SHIELDSA R                Acct#: [id number]      Status: REG REF



   Re/17/10



   Age/Sex: 49/F                         Unit#: [id number]       Location: [facility]



    : [birth year]



   -----------------------------------------------------------------------------
--



   -------------



   



   



   Specimen: [id number]   SOUT   Spec Date: 08/17/10        David Dr: Bea shipman MD



   Spec Type: CYTOLOGY            Received:  08/18/   Copies to:



   



   



   SOURCE



   



   ECTOCERVICAL/ENDOCERVICAL Thin Prep with Reflex HPV Test



   



   



   



   PATIENT INFORMATION



   



   ACTUAL COLLECTION DATE: 08/17/10



   



   



   PREGNANCY?  No



   



   



   POST MENOPAUSAL?  No



   



   



   HYSTERECTOMY?  No



   



   



   LAST MENSTRUAL PERIOD:  08/11/10



   



   



   DATE OF PRIOR SPECIMEN:   09



   



   



   



   ADEQUACY OF SPECIMEN



   



   Satisfactory for evaluation  *



   



   



   Transformation zone component identified  *



   



   



   



   *******DIAGNOSIS*******



   



   NEGATIVE FOR INTRAEPITHELIAL LESION OR MALIGNANCY  *



   



   



   



   ******************************



   



   This Pap test was evaluated with the assistance of the



   ThinPrep Pap Test Imaging System.



   However, due to technical or cytologic issues, the imaging



   could not be completed.  Comprehensive manual rescreening



   by a Cytotechnologist was performed.



   



   ******************************



   



   The Pap Smear is a screening test designed to aid in the detection of 
premalign



   ant and



   



   malignant conditions of the uterine cervix.  It is not a diagnostic 
procedure a



   nd should



   



   not be used as the sole means of detecting cervical cancer.  Both false-
positiv



   e and



   



   false-negative reports do occur. Depending on your risk status, a Pap smear 
itzel



   uld be



   



   obtained and evaluated every one to three years.



   



   Final Interpretation electronically signed by: DONA CHAVIRA(ASCP) 08/18/10 
114



   9



   



   -----------------------------------------------------------------------------
--



   -------------



   



   



   DEPARTMENT OF PATHOLOGY, [address] [place] [address]



   Phone #[phone]   Fax #[phone]   [facility] Permit #[id number]



   010



   Everett Conde M.D. Director   Darwin Gilmore M.D. Assistant 



   katey



   -----------------------------------------------------------------------------
--



   -------------

 

 21  ---------------------------------------------------------------------------
----



   -------------



   



   RUN DATE: 09               [facility] **LIVE**



   PAGE 1



   RUN TIME: 1545                            Specimen Inquiry



   RUN USER: INTERFACE



   -----------------------------------------------------------------------------
--



   -------------



   



   Name: DANGELOLINDSEY R                Acct#: [id number]      Status: REG REF



   Re09



   Age/Sex: 48/F                         Unit#: [id number]       Location: [facility].O.B. : [birth year]



   -----------------------------------------------------------------------------
--



   -------------



   



   



   Specimen: [id number]   SOUT   Spec Date: 09        David Dr: Bea shipman MD



   Spec Type: CYTOLOGY            Received:     Copies to:



   



   



   SOURCE



   



   ECTOCERVICAL/ENDOCERVICAL Thin Prep with Reflex HPV Test



   



   



   



   PATIENT INFORMATION



   



   ACTUAL COLLECTION DATE: 09



   



   



   LAST MENSTRUAL PERIOD:  09



   



   



   DATE OF PRIOR SPECIMEN:   08



   



   



   



   ADEQUACY OF SPECIMEN



   



   Satisfactory for evaluation  *



   



   



   Transformation zone component identified  *



   



   



   



   *******DIAGNOSIS*******



   



   NEGATIVE FOR INTRAEPITHELIAL LESION OR MALIGNANCY  *



   



   



   



   ******************************



   



   This Pap test was evaluated with the assistance of the



   ThinPrep Pap Test Imaging System.



   



   ******************************



   



   The Pap Smear is a screening test designed to aid in the detection of 
premalign



   ant and



   



   malignant conditions of the uterine cervix.  It is not a diagnostic 
procedure a



   nd should



   



   not be used as the sole means of detecting cervical cancer.  Both false-
positiv



   e and



   



   false-negative reports do occur. Depending on your risk status, a Pap smear 
itzel



   uld be



   



   obtained and evaluated every one to three years.



   



   Initial evaluation performed by    SANAZ ROWE([facility]) 09



   



   



   Final Interpretation electronically signed by: SANAZ ROWE([facility]) 09 
1544



   



   



   -----------------------------------------------------------------------------
--



   -------------



   



   



   



   



   



   -----------------------------------------------------------------------------
--



   -------------



   



   DEPARTMENT OF PATHOLOGY, [address] [place] [address]



   Phone #[phone]   Fax #[phone]   [facility] Permit #[id number]



   010



   Everett Conde M.D. Director Darwin Gilmore M.D. Assistant Dir



   katey



   -----------------------------------------------------------------------------
--



   -------------

 

 22  ---------------------------------------------------------------------------
----



   -------------



   



   RUN DATE: 06               [facility] NMI **TEST**



   PAGE 1



   RUN TIME: 1139                            Specimen Inquiry



   RUN USER: INTERFACE



   [id number] LINDSEY SHIELDS 45/F &lt;REG REF &gt; ([id number])  2SP CECILIA Welch Jr, MD.



   -----------------------------------------------------------------------------
--



   -------------



   



   Specimen: [id number]    SOUT   Spec Date: 06        Subm Dr: CECILIA Welch Jr., MD.



   Spec Type: SURGICAL P          Received:     Copies to:



   Gina Arellano MD.



   



   



   SPECIMEN



   



   RIGHT BREAST CALCIFICATIONS



   



   HISTORY



   



   PRE-OP DIAGNOSIS:    Indeterminent calcifications



   



   



   



   GROSS DESCRIPTION



   



   The specimen is received in formalin labelled "Lindsey Shields,



   Stereotactic Bx Right Breast Ca++" and consists of a 4.0 x 4.0 x 0.2



   aggregate of cores of yellow to white tissue. These are wrapped in tissue



   paper and completely submitted in A and B.



   



   ******DIAGNOSIS******



   



   Breast, right, needle biopsy -



   A)   No malignancy.



   B)   Fibrocystic changes:



   1)   Fibrosis.



   2)   Duct ectasia.



   3)   Apocrine metaplasia.



   4)   Focal sclerosing adenosis.



   C)   Microcalcifications identified.



   



   Signed Electronically signed                    FIELD,LIBIA E MD 06



   



   -----------------------------------------------------------------------------
--



   -------------



   



   



   



   



   



   



   



   



   



   



   



   



   



   



   



   



   



   



   -----------------------------------------------------------------------------
--



   -------------



   



   DEPARTMENT OF PATHOLOGY, [address] [place] [address]



   Phone #[phone]   Fax #[phone]   [facility] Permit #[id number]



   010



   Libia Haddad II, M.D. Director    Everett Conde M.D. Assistant D



   irector



   -----------------------------------------------------------------------------
--



   -------------

 

 23  Source.............Cervical;Endocervical Dates / Results....[id number] No. 
of containers..01



   [employer] Collection Vial

 

 24  OTHER:  NEGATIVE FOR SQUAMOUS INTRAEPITHELIAL LESION (NSI).



   CYTOLOGICALLY BENIGN ENDOMETRIAL CELLS ARE PRESENT.  THE SHEDDING OF



   ENDOMETRIAL CELLS IN ABDULAZIZ/POST MENOPAUSAL WOMEN MAY REPRESENT BENIGN



   ENDOMETRIAL LESIONS, HORMONAL ALTERATIONS OR UNCOMMONLY ENDOMETRIAL



   ABNORMALITIES.



   CELLULAR CHANGES ASSOCIATED WITH INFLAMMATION ARE PRESENT.



   PLEASE INDICATE LMP ON FUTURE PATIENT'S REQUEST FORMS.



   THIS SPECIMEN WAS RESCREENED AS PART OF OUR  PROGRAM.

 

 25  Satisfactory for evaluation.  Endocervical and/or squamous metaplastic



   cells (endocervical component) are present.

 

 26  V76.2 ; Screening for malignant neoplasm of the cervix

 

 27  Nancie Atkinson, Cytotechnologist (ASCP)

 

 28  Shannon Yang, Cytotechnologist (ASCP)

 

 29  The Pap smear is a screening test designed to aid in the detection of pre-



   malignant and malignant conditions of the uterine cervix.  It is not a



   diagnostic procedure and should not be used as the sole means of detecting



   cervical cancer.  Both false-positive and false-negative reports do occur.



                                                                            .

 

 30  The HPV DNA reflex criteria were not met with this specimen result



   therefore, no HPV testing was performed.



                                                                    .







Procedures







 Date  CPT Code  Description  Status

 

 2017  93660  Echography Transvaginal  Completed

 

 2017  15625  Biopsy Endometrial W/O Cervical Dilation  Completed

 

 04/10/2017    Mammogram  Completed

 

 2016  72005  Echography Transvaginal  Completed

 

 2013  58269  Echography Pelvic Complete  Completed

 

 2013  48956  Biopsy Endometrial W/O Cervical Dilation  Completed

 

 2013    Colonoscopy  Completed

 

 2006  98713  Biopsy Endometrial W/O Cervical Dilation  Completed







Encounters







 Type  Date  Location  Provider  CPT E/M  Dx

 

 Office Visit  10/31/2017 11:00a  [facility]  Royal Clark, DELMY  16561  N39.0

 

 Office Visit  2017  9:00a  [facility]  KALYANI Tolliver-C  50602  N92.6









 R30.0









 Office Visit  2017  1:00p  [facility]  Katelyn Key ANP-C  04370  N95.1

 

 Office Visit  2016  9:20a  [facility]  KALYANI Tolliver-C  13615  N92.6

 

 Office Visit  2016  9:40a  [facility]  Katelyn Key ANP-C  64373  
Z01.419









 N95.1

 

 N94.89









 Office Visit  2016  3:00p  [facility]  Katelyn Key ANP-C  85982  N95.1









 N76.0









 Office Visit  10/22/2015  9:20a  [facility]  KALYANI Tolliver-C  90060  
Z01.419









 N95.1









 Office Visit  2015 11:20a  [facility]  KALYANI Tolliver-C  66922  616.10

 

 Office Visit  10/20/2014 10:00a  [facility]  KALYANI Tolliver-C  23234  V72.31









 627.2

 

 V76.2









 Office Visit  2014 10:40a  [facility]  Bud Baker MD  44623  626.8









 788.33









 Office Visit  10/15/2013  2:40p  [facility]  Katelyn Key, ANP-C  09455  627.3









 624.8









 Office Visit  10/09/2013  9:20a  [facility]  aKtelyn Key, ANP-C  86876  V72.31









 V76.2

 

 626.8









 Office Visit  2012 10:20a  [place]  [address]  599.89

 

 Office Visit  2012 12:00p  [facility]  Katelyn Key, ANP-C  07970  599.89

 

 Office Visit  2012 10:20a  [facility]  Katelyn Key, ANP-C  52141  616.10









 599.89

 

 V77.1









 Office Visit  2012  9:40a  [facility]  Ashanti Barnett, ENID  09183  789.9

 

 Office Visit  10/24/2011 11:20a  [facility]  Ashanti Barnett, ENID  06535  611.79

 

 Office Visit  2011  9:00a  [facility]  Marilyn Blanchard MD  
29494  V72.31









 V76.2

 

 611.79









 Office Visit  2010  9:40a  [facility]  Bea [place] M.D.  28054  
V72.31









 V76.2

 

 V76.41

 

 617.0









 Office Visit  2009  9:00a  [facility]  Bea [place] M.D.  19842  
V72.31









 V76.2

 

 V76.41

 

 617.0









 Office Visit  2008 11:20a  [facility]  CECILIA Welch JR., M.D.  89745  
V72.31









 V76.2

 

 626.4

 

 788.31









 Office Visit  2006  2:20p  [facility]  CECILIA Welch JR., M.D.  53104  
599.9

 

 Office Visit  2006  2:40p  [facility]  CECILIA Welch JR., M.D.  90627  
V72.41









 626.9









 Office Visit  2006  9:40a  [facility]  CECILIA Welch JR., M.D.  39517  
V72.31









 V76.2

 

 626.2









 Office Visit  2005 10:20a  [facility]  CECILIA Welch JR., M.D.  88160  
V72.31

 

 Office Visit  2004 10:40a  [facility]  CECILIA Welch JR., M.D.  54498  
V72.3









 V78.0







Plan of Care

10/31/2017 - Royal Clark, CNMN39.0 Urinary tract infection, site not 
specifiedComments:Urinalysis suggestive of UTI, pt prefers to wait for culture 
to confirm and intitiate antibiotic treatment. She will call for results thurs 
or friday. Can try Azo for discomfort. Push fluids, continue cranberry tablets 
as desired.FU for further concerns.

## 2018-02-25 NOTE — UC
Arun LUNA Jason, scribed for Elroy Linn MD on 02/25/18 at 1309 .





FLU HPI





- HPI Summary


HPI Summary: 


This patient is a 57 year old F presenting to [facility] with a chief complaint of 

flu like sx since 4 days ago. She states that she began having postnasal drip 

that developed into flu like sx. I woke up with chest pain last night. The 

patient rates the pain 7/10 in severity. Symptoms aggravated by nothing. 

Symptoms alleviated by nothing. Patient reports hx of asthma, chest pain, fever

, productive cough with green mucous.








- History of Current Complaint


Chief Complaint: UCRespiratory


Stated Complaint: URI


Time Seen by Provider: 02/25/18 12:56


Hx Obtained From: Patient


Hx Last Menstrual Period: 4/16/13


Onset/Duration: Gradual Onset, Lasting Days - since 4 days ago, Still Present


Pain Intensity: 7


Pain Scale Used: 0-10 Numeric


Associated Signs & Symptoms: Positive: Fever, Cough





- Allergy/Home Medications


Allergies/Adverse Reactions: 


 Allergies











Allergy/AdvReac Type Severity Reaction Status Date / Time


 


Sulfa (Sulfonamide Allergy  Shortness Verified 02/25/18 12:16





Antibiotics)   of Breath  











Home Medications: 


 Home Medications





Estrogen,Con/M-Progest Acet [Premphase 0.625-5 mg] 1 tab PO 02/25/18 [History]


Montelukast Sodium TAB* [Singulair TAB*] 10 mg PO DAILY 02/25/18 [History 

Confirmed 02/25/18]











PMH/Surg Hx/FS Hx/Imm Hx


Previously Healthy: No


Respiratory History: Asthma





- Surgical History


Surgical History: Yes


Surgery Procedure, Year, and Place: appY.  csection x3.  em.  acl-left.  

TUBAL





- Family History


Known Family History: Positive: Hypertension, Respiratory Disease





- Social History


Alcohol Use: Occasionally


Alcohol Amount: monthly


Substance Use Type: None


Smoking Status (MU): Never Smoked Tobacco





- Immunization History


Most Recent Influenza Vaccination: 2016


Most Recent Pneumonia Vaccination: 2016





Review of Systems


Constitutional: Fever


ENT: Other - postnasal drip


Respiratory: Cough - productive


Cardiovascular: Chest Pain


All Other Systems Reviewed And Are Negative: Yes





Physical Exam





- Summary


Physical Exam Summary: 





General: well-appearing, no pain distress


Skin: warm, color reflects adequate perfusion, dry


Head: normal


Eyes: EOMI, JESUS ALBERTO


ENT: Rhinorrhea


Neck: supple, nontender


Respiratory: CTA, breath sounds present, Occasional rhonchi on lung exam


Cardiovascular: RRR


Abdomen: soft, nontender


Bowel: present


Musculoskeletal: normal, strength/ROM intact, Calves were nontender


Neurological: normal, sensory/motor intact, A&O x3


Psychological: affect/mood appropriate


Triage Information Reviewed: Yes


Vital Signs: 


 Initial Vital Signs











Temp  97.7 F   02/25/18 12:13


 


Pulse  81   02/25/18 12:13


 


Resp  18   02/25/18 12:13


 


BP  107/73   02/25/18 12:13


 


Pulse Ox  97   02/25/18 12:13














Flu Course/Dx





- Course


Course Of Treatment: DISCUSSED VIRAL VERSED BACTERIAL INFECTION AND THE USE OF 

ANTIBIOTICS.  DUE TO ASTHMA HX WILL RX ZPAC.  F/U PMD; GET RECHECKED IF WORSE/

NOT IMPROVED.





- Differential Dx/Diagnosis


Provider Diagnoses: BRONCHITIS.  ASTHMA





Discharge





- Discharge Plan


Condition: Stable


Disposition: HOME


Prescriptions: 


Azithromyxin ADAM (NF) [Z-Adam (Zithromax) 250 mg tabs #6] 2 tab PO .TODAY, THEN 

1 DAILY #6 tab


Patient Education Materials:  Asthma (ED), Acute Bronchitis (ED)


Referrals: 


Romana Hughes MD [Primary Care Provider] - 


Additional Instructions: 


FOLLOW UP WITH YOUR DOCTOR.


GET RECHECKED FOR ANY WORSENING OF YOUR CONDITION OR QUESTIONS OR CONCERNS.








The documentation as recorded by the Arun manning Jason accurately 

reflects the service I personally performed and the decisions made by me, 

Elroy Linn MD.

## 2018-10-14 ENCOUNTER — HOSPITAL ENCOUNTER (EMERGENCY)
Dept: HOSPITAL 25 - UCEAST | Age: 58
Discharge: HOME | End: 2018-10-14
Payer: COMMERCIAL

## 2018-10-14 VITALS — DIASTOLIC BLOOD PRESSURE: 74 MMHG | SYSTOLIC BLOOD PRESSURE: 121 MMHG

## 2018-10-14 DIAGNOSIS — J45.909: ICD-10-CM

## 2018-10-14 DIAGNOSIS — Z88.1: ICD-10-CM
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
## 2018-10-14 DIAGNOSIS — S16.1XXA: Primary | ICD-10-CM
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
## 2018-10-14 PROCEDURE — G0463 HOSPITAL OUTPT CLINIC VISIT: HCPCS

## 2018-10-14 PROCEDURE — 99211 OFF/OP EST MAY X REQ PHY/QHP: CPT

## 2018-10-14 NOTE — UC
Headache HPI





- HPI Summary


HPI Summary: 





pt states she slipped on paper bags 2 days ago  in kitchen and struck L elbow, 

L hip on floor. She did not hit head, or lose consciousness. she was able to 

amb just after fall.She used ibuprofen for pain relief.  yesterday she was very 

busy with activities and today has headache. No photophobia, memory loss. 

complains of R sided Lateral neck pain and stiffness





- History Of Current Complaint


Chief Complaint: UCHeadInjury


Stated Complaint: FELL HEADACHE


Time Seen by Provider: 10/14/18 10:12


Hx Obtained From: Patient


Hx Last Menstrual Period: on meds


Pregnant?: No


Onset/Duration: Sudden Onset


Onset Of Symptoms: Sudden


Initially Headache Was: Initial Pain Scale(0-10)= - 0


Currently Pain Is: Moderate


Pain Intensity: 6


Timing: Constant


Character: Throbbing


Location of Headache: Frontal


Aggravating Factor(s): Nothing


Allevating Factor(s): Nothing


Associated Signs And Symptoms: Positive: Negative





- Allergies/Home Medications


Allergies/Adverse Reactions: 


 Allergies











Allergy/AdvReac Type Severity Reaction Status Date / Time


 


Sulfa (Sulfonamide Allergy  Shortness Verified 10/14/18 10:15





Antibiotics)   of Breath  











Home Medications: 


 Home Medications





Acetaminophen [APAP] 650 mg PO ONCE PRN 10/14/18 [History Confirmed 10/14/18]


Fluticasone DISKUS 100 MCG(NF) [Flovent Diskus 100 MCG(NF)] 1 puff INH DAILY 10/

14/18 [History Confirmed 10/14/18]


Multivitamin [Animal Shapes Vitamins] 1 each PO DAILY 10/14/18 [History 

Confirmed 10/14/18]











PMH/Surg Hx/FS Hx/Imm Hx


Previously Healthy: Yes


Respiratory History: Asthma





- Surgical History


Surgical History: Yes


Surgery Procedure, Year, and Place: appY.  csection x3.  em.  acl-left.  

TUBAL.  tonsilectomy





- Family History


Known Family History: Positive: Hypertension, Respiratory Disease





- Social History


Occupation: Employed Full-time - 


Lives: With Family


Alcohol Use: Occasionally


Alcohol Amount: monthly


Substance Use Type: None


Smoking Status (MU): Never Smoked Tobacco





- Immunization History


Most Recent Influenza Vaccination: 2016


Most Recent Pneumonia Vaccination: 2016





Review of Systems


Constitutional: Negative


Skin: Bruising - R elbow


Eyes: Negative


ENT: Negative


Respiratory: Negative


Cardiovascular: Negative


Neurovascular: Negative


Musculoskeletal: Other: - no decreased ROM c-spine, pain with palp L lateral 

neck, no spinous process pain with palp


Neurological: Headache


Psychological: Negative


Is Patient Immunocompromised?: No


All Other Systems Reviewed And Are Negative: Yes





Physical Exam


Triage Information Reviewed: Yes


Appearance: Well-Appearing, No Pain Distress, Well-Nourished


Vital Signs: 


 Initial Vital Signs











Temp  97.4 F   10/14/18 10:09


 


Pulse  67   10/14/18 10:09


 


Resp  18   10/14/18 10:09


 


BP  121/74   10/14/18 10:09


 


Pulse Ox  100   10/14/18 10:09











Vital Signs Reviewed: Yes


Eye Exam: Normal


Eyes: Positive: Conjunctiva Clear


Neck: Positive: Supple, Other: - palp tenderness R later neck and trapezius area


Respiratory Exam: Normal


Cardiovascular Exam: Normal


Musculoskeletal Exam: Normal


Musculoskeletal: Positive: Strength Intact, ROM Intact


Neurological Exam: Normal


Psychological Exam: Normal


Skin: Positive: Other - bruising L elbow, no deformity





Headache Course/Dx





- Differential Dx/Diagnosis


Provider Diagnoses: cervial neck strain





Discharge





- Sign-Out/Discharge


Documenting (check all that apply): Patient Departure


All imaging exams completed and their final reports reviewed: No Studies





- Discharge Plan


Condition: Good


Disposition: HOME


Patient Education Materials:  Cervical Strain (ED)


Referrals: 


Romana Hughes MD [Primary Care Provider] - 2 Days (if no better)


Additional Instructions: 


use ibuprofen 600mg every 6 hours with food





use heating pad to neck for 15-20min 4 times a day





Report to ER if your headache worsens or new symptoms occur





Rest





- Billing Disposition and Condition


Condition: GOOD


Disposition: Home

## 2019-12-01 ENCOUNTER — HOSPITAL ENCOUNTER (EMERGENCY)
Dept: HOSPITAL 25 - UCEAST | Age: 59
Discharge: HOME | End: 2019-12-01
Payer: COMMERCIAL

## 2019-12-01 VITALS — SYSTOLIC BLOOD PRESSURE: 113 MMHG | DIASTOLIC BLOOD PRESSURE: 79 MMHG

## 2019-12-01 DIAGNOSIS — R53.83: ICD-10-CM

## 2019-12-01 DIAGNOSIS — J32.9: Primary | ICD-10-CM

## 2019-12-01 DIAGNOSIS — R09.82: ICD-10-CM

## 2019-12-01 DIAGNOSIS — Z88.2: ICD-10-CM

## 2019-12-01 DIAGNOSIS — R68.83: ICD-10-CM

## 2019-12-01 PROCEDURE — G0463 HOSPITAL OUTPT CLINIC VISIT: HCPCS

## 2019-12-01 PROCEDURE — 99212 OFFICE O/P EST SF 10 MIN: CPT

## 2019-12-03 NOTE — XMS REPORT
Continuity of Care Document (CCD)

 Created on:2019



Patient:Lindsey Shields

Sex:Female

:1960

External Reference #:[id number]





Demographics







 Address  [address] [place] [address]

 

 Home Phone  [phone]

 

 Work Phone  [phone]

 

 Preferred Language  en

 

 Marital Status  Not  or 

 

 Rastafarian Affiliation  Unknown

 

 Race  White

 

 Ethnic Group  Not  or 









Author







 Name  Mis Ochoa O.D.

 

 Address  [address] [place] [address]









Care Team Providers







 Name  Role  Phone

 

 Jesus Gaitan M.D. -  Care Team Information   [phone]



 Gastroenterology    

 

 Romana Hughes M.D. - Internal  Care Team Information   [phone]
[phone]



 Medicine    









Problems







 Active Problems  Provider  Date

 

 Deviated nasal septum    Onset: 

 

 Asthma    Onset: 

 

 Seasonal allergy    Onset: 

 

 History of calculus of kidney    Onset: 

 

 Blepharitis  Mis Ochoa O.D.  Onset: 2015

 

 Nuclear senile cataract  Mis Ochoa O.D.  Onset: 2015

 

 Conjunctival hemorrhage  Tiana Weinstein O.D.  Onset: 2016

 

 Primary herpes simplex infection of lips    Onset: 

 

 Presbyopia  Mis Ochoa O.D.  Onset: 2018







Social History







 Type  Date  Description  Comments

 

 Birth Sex    Unknown  

 

 ETOH Use    Consumes 1 glass of wine per week  

 

 Tobacco Use  Start: Unknown  Patient has never smoked  

 

 Recreational Drug Use    Never Used Drugs  

 

 Smoking Status  Reviewed: 19  Patient has never smoked  







Allergies, Adverse Reactions, Alerts







 Active Allergies  Reaction  Severity  Comments  Date

 

 Sulfa Antibiotics        2015







Medications







 Active Medications  SIG  Qnty  Indications  Ordering Provider  Date

 

 Levocetirizine Dihydrochloride        Unknown  



                     5mg          



 Tablets          

 

 Flovent HFA        Norbert Farmer M.D.  



  110mcg/Act Aerosol          



           

 

 Ibuprofen        Galyankristen Vicki  



 600mg Tablets        M.D.  

 

 Famciclovir        Jesus Gaitan M.D.  



  500mg Tablets          



           

 

 Montelukast Sodium        Unknown  



         10mg Tablets          



           

 

 Black Cohosh        Unknown  



   40mg Capsules          



           

 

 Progesterone Micronized        Unknown  



              100mg Capsules          



           

 

 Estradiol        Unknown  



 0.5mg Tablets          

 

 Albuterol Sulfate Aster Vasques M.D.  



            108(90Base) mcg/Act          



 Aerosol          







Immunizations







 Description

 

 No Information Available







Vital Signs







 Description

 

 No Information Available







Results







 Description

 

 No Information Available







Procedures







 Date  Code  Description  Status

 

 2019  55253  Patient No Show For Appt  Completed







Medical Devices







 Description

 

 No Information Available







Encounters







 Description

 

 No Information Available







Assessments







 Date  Code  Description  Provider

 

 2019  H25.13  Age-related nuclear cataract, bilateral  Mis Ochoa O.D.

 

 2019  H52.4  Presbyopia  Mis Ochoa O.D.







Plan of Treatment

2019 - Mis Ochoa O.D.H25.13 Age-related nuclear cataract, 
bilateralComments:You have been diagnosed with cataracts. If you are happy with 
your vision as it is now, then we willsee you at your next scheduled 
appointment. If you feel like your vision is getting worse before your 
scheduled appointment, please call Carol at [phone].Follow up:1 Year 
Follow Up  You can expect to have your eyes dilated at your next visit. If Dr. Ochoa orders any additional testing, it may require extra time. We recommend 
that you bring sunglasses, as dilation drops often make you light sensitive 
until they wear off. We always recommend you bring someone to drive you home if 
you are uncomfortable driving with your eyes dilated. If you have any questions 
before your next visit, feel free to call our office at [phone].H52.4 
PresbyopiaComments:You have presbyopia. This is when the lens in your eye loses 
the ability to change focus, and happens as we age. A pair of reading glasses 
will help you see up close.



Functional Status







 Description

 

 No Information Available







Mental Status







 Description

 

 No Information Available







Referrals







 Description

 

 No Information Available

## 2019-12-03 NOTE — XMS REPORT
Continuity of Care Document (CCD)

 Created on:2019



Patient:Lindsey Shields

Sex:Female

:1960

External Reference #:[id number]





Demographics







 Address  [address]



   [place] [address]

 

 Home Phone  [phone]

 

 Mobile Phone  [phone]

 

 Work Phone  [phone]

 

 Email Address  [email or web address]

 

 Preferred Language  en

 

 Marital Status  Not  or 

 

 Jewish Affiliation  Unknown

 

 Race  White

 

 Ethnic Group  Not  or 









Author







 Name  Marquez Contreras M.D., [facility], P[facility] (transmitted by agent of provider 
Melanie Alegria)

 

 Address  [address] [place] [address]









Care Team Providers







 Name  Role  Phone

 

 Romana Hughes MD - Internal  Care Team Information   [phone]
[phone]



 Medicine    

 

 Сергей Liu MD - Allergy &  Care Team Information   [phone]
[phone]



 Immunology    









Problems







 Active Problems  Provider  Date

 

 Asthma  Romana Hughes M.D.  Onset: 10/18/2016

 

 Mild intermittent asthma  Aster Bateman MD  Onset: 2017

 

 Pulmonary valve disorder  Marquez Contreras M.D., [facility], [facility]  Onset: 10/23/
2019

 

 Chest pain  Marquez Contreras M.D., [facility], [facility]  Onset: 10/23/2019







Social History







 Type  Date  Description  Comments

 

 Birth Sex    Unknown  

 

 Tobacco Use  Start: Unknown  Never Smoked Cigarettes  

 

 Smoking Status  Reviewed: 10/23/19  Never Smoked Cigarettes  

 

 ETOH Use    Currently consumes  about 2 drinks a



     alcohol  month

 

 Tobacco Use  Start: Unknown  Patient has never  



     smoked  

 

 Recreational Drug Use    Denies Drug Use  

 

 Exercise Type/Frequency    Exercises regularly  

 

 Exercise Type/Frequency    Walks daily  







Allergies, Adverse Reactions, Alerts







 Active Allergies  Reaction  Severity  Comments  Date

 

 Sulfa Antibiotics        2014

 

 Seasonal, Dust, Mold, Chicken, Tomatoes, Soy        2017

 

 Soy Proteins        2017







Medications







 Active Medications  SIG  Qnty  Indications  Ordering  Date



         Provider  

 

 Estradiol  1 tab in the am  8tabs    Marquez Spanlger  10/23/2019



       0.5mg Tablets        ELIER Contreras,  



         [facility], [facility]  

 

 Levocetirizine  Take One Tablet  90tabs  L50.1  Romana  2019



 Dihydrochloride  By Mouth Every      Cotton, M.D.  



             5mg Tablets  Day        



           

 

 Proair HFA  1-2 puffs by  8.500gm    Aster Bateman,  2016



        108(90Base)  mouth every 4      MD  



 mcg/Act Aerosol  hours as needed        



           

 

 Flovent HFA  inhale 2 puffs  12units    Aster Bateman,  



         110mcg/Act  into lungs two      MD  



 Aerosol  times a day        



           

 

 Famciclovir  1 by mouth  60tabs    Romana  



         250mg Tablets  twice a day as      Cotton, M.D.  



   needed        

 

 Melatonin Fast Meltz  prn      Unknown  



                  500mcg          



 Tablets Dispers          



           

 

 Mucinex  1 tab twice a      Unknown  



     600mg Tablets ER  day by mouth as        



 12HR  needed        

 

 Airborne Gummies  1 po qd      Unknown  



               Chewtabs          



           

 

 Montelukast Sodium  Take One Tablet  90tabs    Romana  



                10mg  By Mouth Every      Cotton, M.D.  



 Tablets  Day In The        



   Evening        

 

 Progesterone Micronized  take 1 capsule      Unknown  



   by mouth        



 100mg Capsules          



           

 

 Progesterone Micronized  one tab in the      Unknown  



   am        



 100mg Capsules          



           







Immunizations







 CPT Code  Status  Date  Vaccine  Lot #

 

 [id number]  Given  2019  Pneumonia Vaccine  

 

 73373  Given  10/23/2018  Influenza Virus Vaccine, Quadrivalent, Split,  



       Preservative Free  

 

 79233  Given  2017  Influenza Virus Vaccine, Quadrivalent, Split,  [id number]



       Preservative Free  

 

 71082  Given  10/18/2016  Pneumonia Vaccine  [id number]







Vital Signs







 Date  Vital  Result  Comment

 

 10/23/2019  9:58am  Height  64 inches  5'4"









 Weight  132.00 lb  with shoes

 

 Heart Rate  60 /min  

 

 BP Systolic Sitting  120 mmHg  LA

 

 BP Diastolic Sitting  90 mmHg  LA

 

 BP Systolic Standing  122 mmHg  LA

 

 BP Diastolic Standing  90 mmHg  LA

 

 BMI (Body Mass Index)  22.7 kg/m2  

 

 Ejection Fraction  55-60%  date 10/09/19 echo









 2019 10:43am  Height  63 inches  5'3"









 Weight  131.00 lb  

 

 Heart Rate  71 /min  

 

 BP Systolic Sitting  102 mmHg  

 

 BP Diastolic Sitting  60 mmHg  

 

 O2 % BldC Oximetry  97 %  

 

 BMI (Body Mass Index)  23.2 kg/m2  







Results







 Test  Date  Facility  Test  Result  H/L  Range  Note

 

 Laboratory test  07/15/2019  [facility]  Cytology  SEE RESULT      1



 finding    [address]    [address]OW      



     [place], [place] [id number] [phone]          









 1  SEE RESULT BELOW



   -----------------------------------------------------------------------------
---------------



   Name:  MIKE SHIELDSA R                : 1960    Attend Dr: Ross Mora MD



   Acct:  [id number]  Unit: [id number]  AGE: 58            Location:  [facility]



   Re/15/19                        SEX: F             Status:    REG REF



   -----------------------------------------------------------------------------
---------------



   



   SPEC: [id number]            MILAGROS: 07/15/      [facility] DR: Ross Mora MD



   REQ:  [id number]             RECD: [id number]



   STATUS: NINO SALDANA DR: Romana Hughes MD



   _



   ORDERED:  TP IMAGE ANALYS, HPV/Thin Prep



   COMMENTS: [id number]



   Negative for Intraepithelial lesion or Malignancy



   



   -----------------------------------------------------------------------------
---------------



   Date     Time Test              Result   Flag (u) Normal Range



   07/15/19 1435   HPV RNA         Negative          Negative



   



   The high-risk HPV types detected by the assay include: 16,



   18, 31, 33, 35, 39, 45, 51, 52, 56, 58, 59, 66, and 68.



   -----------------------------------------------------------------------------
---------------



   



   A. Ectocervical/Endocervical



   Specimen Adequacy:



   Satisfactory of evaluation



   Transformation zone component cannot be definitely identified due to



   presence of atrophy or other hormonal changes



   Patient Information:



   HPV: High risk HPV RNA testing regardless of pap results.



   Actual Specimen Date:         07/15/19



   Last Menstrual Date:          18



   Date of Last Specimen:        18



   



   Signed by and Reported on: __________              ALEXEI Shipman(ASCP)  [id number]



   



   This Pap test was evaluated with the assistance of the [employer] Test Imaging 
System. Due to



   cytologic findings at the imager microscope, comprehensive manual 
rescreening by a



   Cytotechnologist may be required.



   The Pap Smear is a screening test designed to aid in the detection of 
premalignant and



   malignant conditions of the uterine cervix.  It is not a diagnostic 
procedure and should



   not be used as the sole means of detecting cervical cancer.  Both false-
positive and false-



   negative reports do occur.  Depending on your risk status, a Pap smear 
should be obtained



   and evaluated every 1-3 years.



   



   -----------------------------------------------------------------------------
---------------



   



   



   



   



   ** END OF REPORT **



   



   DEPARTMENT OF PATHOLOGY,  [address] [place] [address]



   Phone # [phone]      Fax #[phone]



   Everett Conde M.D. Director     [facility] # [id number]







Procedures







 Date  Code  Description  Status

 

 10/23/2019  62067  EKG Tracing & Interpretation  Completed

 

 10/09/2019  14263  ECHO Transthoracic, Real-Time 2D With Doppler And  Completed



     Color Flow  

 

 10/09/2019  03217  ECHO Transthoracic, Real-Time 2D With Doppler And  Completed



     Color Flow  

 

 2019  75141  Destruction ALL Benign Or Premalignant Lesion (Other  
Completed



     Than [place]  

 

 2019  67748203  Mammogram  Completed

 

 2018  90456953  Mammogram  Completed

 

 04/10/2017  44758790  Mammogram  Completed

 

 2016  265213767  Bone Mineral Density Test  Completed

 

 2016  72466873  Mammogram  Completed

 

 2015  09037891  Mammogram  Completed

 

 2014  92397238  Colonoscopy  Completed

 

 2009  05908189  Colonoscopy  Completed







Medical Devices







 Description

 

 No Information Available







Encounters







 Type  Date  Location  Provider  Dx  Diagnosis

 

 Office Visit  10/23/2019  [facility]  Marquez Spangler  R07.9  Chest pain,



   10:15a  Of Liam Contreras M.D.,    unspecified



       FAC, FASNC    









 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









 Office Visit  2019  [facility]  Aster  J45.20  Mild intermittent



   10:45a  [facility]  MD Fransico    asthma, uncomplicated



     Of [facility]      

 

 Office Visit  2019  [facility]  Veronica  L81.4  Other melanin



   9:00a    MD Tyrone    hyperpigmentation









 D18.01  Hemangioma of skin and subcutaneous tissue

 

 L82.1  Other seborrheic keratosis

 

 Z80.8  Family history of malignant neoplasm of organs or systems

 

 L57.0  Actinic keratosis







Assessments







 Date  Code  Description  Provider

 

 10/23/2019  R07.9  Chest pain, unspecified  Marquez Contreras M.D., FACC,



       FASNC

 

 10/23/2019  I37.8  Other nonrheumatic pulmonary valve  Marquez Contreras M.D., 
FACC,



     disorders  FASNC

 

 10/09/2019  R07.9  Chest pain, unspecified  Marquez Contreras M.D., FACC,



       FASNC

 

 10/09/2019  R07.9  Chest pain, unspecified  Traveling ECHO 1

 

 2019  J45.20  Mild intermittent asthma,  Aster Bateman MD



     uncomplicated  

 

 2019  L81.4  Other melanin hyperpigmentation  Veronica Hansen MD

 

 2019  D18.01  Hemangioma of skin and subcutaneous  Veronica Hansen MD



     tissue  

 

 2019  L82.1  Other seborrheic keratosis  Veronica Hansen MD

 

 2019  Z80.8  Family history of malignant neoplasm  Veronica Hansen MD



     of other organs or syst  

 

 2019  L57.0  Actinic keratosis  Veronica Hansen MD







Plan of Treatment

Future Appointment(s):2020 10:20 am - Romana Hughes M.D. at [facility] 
Internal Medicine - [id number]  9:30 am - Veronica Hansen MD at [facility] 
[id number]/ - Marquez Contreras M.D., [facility], JCCYAD63.9 Chest pain, 
unspecifiedNew Orders:Stress Test, Treadmill, No Imaging, Ordered: 10/23/
19Comments:As discussed, we will check a stress test for you. Your pulmonic 
valve remains only mildly leaky.I37.8 Other nonrheumatic pulmonary valve 
disordersNew Orders:Echocardiogram, Ordered: 10/23/19Follow up:after echo in 3 
years



Functional Status







 Functional Condition  Comment  Date  Status

 

 Glasses      Active







Mental Status







 Description

 

 No Information Available







Referrals







 Description

 

 No Information Available

## 2019-12-08 NOTE — UC
Respiratory Complaint HPI





- HPI Summary


HPI Summary: 


59 year old female with no other PMH presents with ~ 1 month of ongoing sinus 

pressure, congestion, post nasal drip    minimal sore throat, no ear pain.   no 

fever, chills.   Feels as though symptoms are beginning to go into chest with 

occasional "rattle" type sensation.   no SOB, no cough.  











- History of Current Complaint


Chief Complaint: UCRespiratory


Stated Complaint: CONGESTED


Time Seen by Provider: 12/01/19 14:36


Hx Obtained From: Patient


Hx Last Menstrual Period: post


Pregnant?: No


Onset/Duration: Sudden Onset, Lasting Weeks


Pain Intensity: 3


Pain Scale Used: 0-10 Numeric


Aggravating Factors: Allergens, Deep Breaths


Associated Signs And Symptoms: Positive: URI





- Allergies/Home Medications


Allergies/Adverse Reactions: 


 Allergies











Allergy/AdvReac Type Severity Reaction Status Date / Time


 


Sulfa (Sulfonamide Allergy  Shortness Verified 12/01/19 14:39





Antibiotics)   of Breath  














PMH/Surg Hx/FS Hx/Imm Hx


Previously Healthy: Yes





- Surgical History


Surgical History: Yes


Surgery Procedure, Year, and Place: appY.  csection x3.  em.  acl-left.  

TUBAL.  tonsilectomy





- Family History


Known Family History: Positive: Hypertension, Respiratory Disease





- Social History


Alcohol Use: Occasionally


Alcohol Amount: monthly


Substance Use Type: None


Smoking Status (MU): Never Smoked Tobacco





- Immunization History


Most Recent Influenza Vaccination: 2016


Most Recent Pneumonia Vaccination: 2016





Review of Systems


All Other Systems Reviewed And Are Negative: Yes


Constitutional: Negative: Fever, Chills, Fatigue


Skin: Positive: Negative


Eyes: Positive: Negative


ENT: Positive: Nasal Discharge, Sinus Congestion, Sinus Pain/Tenderness.  

Negative: Sore Throat, Ear Ache


Respiratory: Negative: Shortness Of Breath, Cough


Neurovascular: Positive: Negative


Musculoskeletal: Positive: Negative


Psychological: Positive: Negative


Is Patient Immunocompromised?: No





Physical Exam


Triage Information Reviewed: Yes


Appearance: Well-Appearing, No Pain Distress, Well-Nourished


Vital Signs: 


 Initial Vital Signs











Temp  97.7 F   12/01/19 14:30


 


Pulse  80   12/01/19 14:30


 


Resp  17   12/01/19 14:30


 


BP  113/79   12/01/19 14:30


 


Pulse Ox  97   12/01/19 14:30











Vital Signs Reviewed: Yes


Eyes: Positive: Conjunctiva Clear


ENT: Positive: Hearing grossly normal, Pharynx normal, Sinus tenderness - b/l 

frontal , max.  Negative: Tonsillar swelling, Tonsillar exudate, Uvula midline


Neck: Positive: Supple, Nontender, No Lymphadenopathy.  Negative: Nuchal 

Rigidity, Enlarged Nodes @


Respiratory: Positive: Chest non-tender, Lungs clear, Normal breath sounds, No 

respiratory distress, No accessory muscle use.  Negative: Respiratory distress, 

Crackles, Rhonchi, Stridor, Wheezing


Cardiovascular: Positive: RRR, No Murmur


Musculoskeletal Exam: Normal


Neurological Exam: Normal


Psychological Exam: Normal


Skin: Negative: Rashes





Respiratory Course/Dx





- Course


Course Of Treatment: 





Sinusitis: 





- Increase fluid intake 


- Antibiotics as directed 


- Follow up with primary physician within 3-5 days if no improvement 


- GO to ER with increased symptoms, shortness of breath/ difficulty breathing 


- Over the counter medications for symtpoms, such as decongestants, motrin/ 

tylenol for pain, aches 





- Differential Dx/Diagnosis


Provider Diagnosis: 


 Sinusitis








Discharge ED





- Sign-Out/Discharge


Documenting (check all that apply): Patient Departure


All imaging exams completed and their final reports reviewed: No Studies





- Discharge Plan


Condition: Fair


Disposition: HOME


Prescriptions: 


Albuterol HFA INHALER* [Ventolin HFA Inhaler*] 1 - 2 puff INH Q4H PRN #1 mdi


 PRN Reason: shortness of breath 


Amoxicillin/Clavulanate TAB* [Augmentin *] 875 mg PO BID #20 tab


methylPREDNISolone [Medrol] 4 mg PO .SEE TAP #1 packet


Patient Education Materials:  Sinusitis (ED)


Referrals: 


Romana Hughes MD [Primary Care Provider] - 


Additional Instructions: 


- Increase fluid intake 


- Antibiotics as directed 


- Follow up with primary physician within 3-5 days if no improvement 


- GO to ER with increased symptoms, shortness of breath/ difficulty breathing 


- Over the counter medications for symtpoms, such as decongestants, motrin/ 

tylenol for pain, aches 








- Billing Disposition and Condition


Condition: FAIR


Disposition: Home





- Attestation Statements


Provider Attestation: 


I was available for consult. This patient was seen by the ANABELLA. The patient was 

not presented to , seen by or examined by me -Marlene rPadhan MD

## 2020-03-14 ENCOUNTER — HOSPITAL ENCOUNTER (EMERGENCY)
Dept: HOSPITAL 25 - UCEAST | Age: 60
Discharge: HOME | End: 2020-03-14
Payer: COMMERCIAL

## 2020-03-14 VITALS — DIASTOLIC BLOOD PRESSURE: 75 MMHG | SYSTOLIC BLOOD PRESSURE: 113 MMHG
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
## 2020-03-14 DIAGNOSIS — Z79.52: ICD-10-CM

## 2020-03-14 DIAGNOSIS — Z88.8: ICD-10-CM

## 2020-03-14 DIAGNOSIS — Z88.2: ICD-10-CM

## 2020-03-14 DIAGNOSIS — S61.451A: Primary | ICD-10-CM

## 2020-03-14 DIAGNOSIS — J45.909: ICD-10-CM

## 2020-03-14 PROCEDURE — G0463 HOSPITAL OUTPT CLINIC VISIT: HCPCS

## 2020-03-14 PROCEDURE — 12001 RPR S/N/AX/GEN/TRNK 2.5CM/<: CPT

## 2020-03-14 PROCEDURE — 99202 OFFICE O/P NEW SF 15 MIN: CPT

## 2020-03-14 PROCEDURE — 90715 TDAP VACCINE 7 YRS/> IM: CPT

## 2020-03-14 NOTE — XMS REPORT
Continuity of Care Document (CCD)

 Created on:March 3, 2020



Patient:Lindsey Shields

Sex:Female

:1960

External Reference #:[id number]





Demographics







 Address  [address]



   [place] [id number]

 

 Home Phone  [phone]

 

 Mobile Phone  [phone]

 

 Work Phone  [phone]

 

 Email Address  [email or web address]

 

 Preferred Language  en

 

 Marital Status  Not  or 

 

 Orthodoxy Affiliation  Unknown

 

 Race  White

 

 Ethnic Group  Not  or 









Author







 Name  Romana Hughes M.D. (transmitted by agent of provider Tabitha Artis)

 

 Address  [address], [address]



   Unavailable



   [place] [address]









Care Team Providers







 Name  Role  Phone

 

 Romana Hughes MD - Internal  Care Team Information   [phone]
[phone]



 Medicine    

 

 Сергей Liu MD - Allergy &  Care Team Information   [phone]
[phone]



 Immunology    









Problems







 Active Problems  Provider  Date

 

 Asthma  Romana Hughes M.D.  Onset: 10/18/2016

 

 Mild intermittent asthma  Aster Bateman MD  Onset: 2017

 

 Chest pain  Marquez Contreras M.D., [facility], [facility]  Onset: 10/23/2019

 

 Pulmonary valve disorder  Marquez Contreras M.D., [facility], [facility]  Onset: 10/23/
2019







Social History







 Type  Date  Description  Comments

 

 Birth Sex    Unknown  

 

 Tobacco Use  Start: Unknown  Never Smoked Cigarettes  

 

 Smoking Status  Reviewed: 20  Never Smoked Cigarettes  

 

 ETOH Use    Currently consumes  about 2 drinks a



     alcohol  month

 

 Tobacco Use  Start: Unknown  Patient has never  



     smoked  

 

 Recreational Drug Use    Denies Drug Use  

 

 Exercise Type/Frequency    Exercises regularly  

 

 Exercise Type/Frequency    Walks daily  







Allergies, Adverse Reactions, Alerts







 Active Allergies  Reaction  Severity  Comments  Date

 

 Sulfa Antibiotics        2014

 

 Seasonal, Dust, Mold, Chicken, Tomatoes, Soy        2017

 

 Soy Proteins        2017







Medications







 Active Medications  SIG  Qnty  Indications  Ordering  Date



         Provider  

 

 Venlafaxine HCL ER  1 by mouth  30caps  F41.9  Romana  2020



                75mg  every day      ELIER Hughes  



 Caps ER 24HR          



           

 

 Prednisone  2 tabs daily  28units    Aster Bateman,  2019



        10mg (21) TBPK  for 7 days, 1      MD  



   tab daily for 2        



   weeks        

 

 Estradiol  1 tab in the am  8tabs    Marquez Spangler  10/23/2019



       0.5mg Tablets        ELIER Contreras,  



         [facility], FASNC  

 

 Levocetirizine  Take One Tablet  90tabs  L50.1  Romana  2019



 Dihydrochloride  By Mouth Every      Cotton, M.D.  



             5mg Tablets  Day        



           

 

 Proair HFA  1-2 puffs by  8.500gm    Aster Bateman,  2016



        108(90Base)  mouth every 4      MD  



 mcg/Act Aerosol  hours as needed        



           

 

 Flovent HFA  inhale 1-2  12units    Aster Bateman,  



         110mcg/Act  puffs into      MD  



 Aerosol  lungs two times        



   a day        

 

 Famciclovir  1 by mouth  60tabs    Romana  



         250mg Tablets  twice a day as      Saul M.D.  



   needed        

 

 Melatonin Fast Meltz  prn      Unknown  



                  500mcg          



 Tablets Dispers          



           

 

 Mucinex  1 tab twice a      Unknown  



     600mg Tablets ER  day by mouth as        



 12HR  needed        

 

 Airborne Gummies  1 po qd      Unknown  



               Chewtabs          



           

 

 Montelukast Sodium  Take One Tablet  90tabs    Romana  



                10mg  By Mouth Every      Cotton, M.D.  



 Tablets  Day In The        



   Evening        

 

 Progesterone Micronized  take 1 capsule      Unknown  



   by mouth        



 100mg Capsules          



           

 

 Progesterone Micronized  one tab in the      Unknown  



   am        



 100mg Capsules          



           









 History Medications









 Venlafaxine HCL ER  take 1 by mouth  60caps  F41.9  Romana Saul,  2020 -



   every day x 2      M.D.  2020



 37.5mg Caps ER 24HR  weeks, then        



   increase to        



   2/daily        

 

 Doxycycline Hyclate  one tablet twice  14caps    Aster Bateman,  2019 -



   daily for 7      MD  2019



 100mg Capsules  days.        



           

 

 Azithromycin  take 2 tablets  6tabs    Aster Bateman,  2019 -



            250mg  by mouth today      MD  2019



 Tablets  then take 1        



   tablet daily for        



   4 days        







Medications Administered in Office







 Medication  SIG  Qnty  Indications  Ordering Provider  Date

 

 [facility] pharmacy administered        Unknown  2020



                 Injection          



           







Immunizations







 CPT Code  Status  Date  Vaccine  Lot #

 

 [id number]  Given  2019  Pneumonia Vaccine  

 

 [id number]  Given  10/23/2018  Influenza Virus Vaccine, Quadrivalent, Split,  



       Preservative Free  

 

 83491  Given  2017  Influenza Virus Vaccine, Quadrivalent, Split,  [id number]



       Preservative Free  

 

 83882  Given  10/18/2016  Pneumonia Vaccine  [id number]







Vital Signs







 Date  Vital  Result  Comment

 

 2020  9:03am  Height  64 inches  5'4"









 Weight  129.00 lb  

 

 Heart Rate  70 /min  

 

 BP Systolic  112 mmHg  

 

 BP Diastolic  75 mmHg  

 

 O2 % BldC Oximetry  96 %  

 

 BMI (Body Mass Index)  22.1 kg/m2  









 2020 10:31am  Height  64 inches  5'4"









 Weight  130.00 lb  

 

 Heart Rate  64 /min  

 

 BP Systolic  97 mmHg  

 

 BP Diastolic  66 mmHg  

 

 O2 % BldC Oximetry  96 %  

 

 BMI (Body Mass Index)  22.3 kg/m2  







Results







 Test  Acquired Date  Facility  Test  Result  H/L  Range  Note

 

 Lipid Profile  2020  [facility]  Triglycerides  77 mg/dL      
1



 (Trig/Chol/HDL)    [address]          



     [place] [address] [phone]          









 Cholesterol  252 mg/dL      2

 

 HDL Cholesterol  61.2 mg/dL      3

 

 LDL Cholesterol  175 mg/dL      4









 Laboratory  2020  [facility]  TSH (Thyroid  2.82  Normal  0.34
-5.60  



 test finding    [address]  Stim Horm)  mcIU/mL      



     [place], [place] [address] [phone]          









 Glucose  98 mg/dL  Normal    









 1  Desirable: <150



   Borderline High: 150-199



   High: 200-499



   Very High: >500

 

 2  Desirable: <200



   Borderline High: 200-239



   High: >239

 

 3  Low: <40



   Desirable: 40-60



   High: >60

 

 4  Desirable: <100



   Near Optimal: 100-129



   Borderline High: 130-159



   High: 160-189



   Very High: >189







Procedures







 Date  Code  Description  Status

 

 2019  12487  Stress Test  Completed

 

 2019  23243  Diffusing Capacity  Completed

 

 2019  13358  Plethysmography Determination Lung Volumes & Per  Completed



     Airway Resist  

 

 2019  65718  Pulmonary Function><Bronchodil  Completed

 

 10/23/2019  38723  EKG Tracing & Interpretation  Completed

 

 10/09/2019  06740  ECHO Transthoracic, Real-Time 2D With Doppler And  Completed



     Color Flow  

 

 10/09/2019  30274  ECHO Transthoracic, Real-Time 2D With Doppler And  Completed



     Color Flow  

 

 2019  07135754  Mammogram  Completed

 

 2018  23762358  Mammogram  Completed

 

 04/10/2017  20945031  Mammogram  Completed

 

 2016  351445246  Bone Mineral Density Test  Completed

 

 2016  31343959  Mammogram  Completed

 

 2015  80349219  Mammogram  Completed

 

 2014  97705110  Colonoscopy  Completed

 

 2009  26553192  Colonoscopy  Completed







Medical Devices







 Description

 

 No Information Available







Encounters







 Type  Date  Location  Provider  Dx  Diagnosis

 

 Office Visit  2020  [facility] Internal  Romana Hughes,  Z00.00  Encntr for



   10:20a  Medicine - [facility]  AYLEENDChristiano    general adult



           medical exam w/o



           abnormal findings









 Z12.11  Encounter for screening for malignant neoplasm of colon

 

 F41.9  Anxiety disorder, unspecified

 

 Z12.31  Encntr screen mammogram for malignant neoplasm of breast









 Office Visit  10/23/2019 10:15a  [facility]  Marquez Spangler  R07.9  Chest 
pain,



     Of Liam Contreras M.D.,    unspecified



       FACC, FASNC    









 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









 Office Visit  2019  [facility]ster  J45.20  Mild intermittent



   10:45a  [facility]  MD Fransico    asthma,



     Cma      uncomplicated







Assessments







 Date  Code  Description  Provider

 

 2020  F41.9  Anxiety disorder, unspecified  Romana Hughes M.D.

 

 2020  Z00.00  Encounter for general adult medical  Romana Hughes M.D.



     examination without abnormal  



     findings  

 

 2020  Z12.11  Encounter for screening for  Romana Hughes M.D.



     malignant neoplasm of colon  

 

 2020  F41.9  Anxiety disorder, unspecified  Romana Hughes M.D.

 

 2020  Z12.31  Encounter for screening mammogram  Romana Hughes M.D.



     for malignant neoplasm of breast  

 

 2019  R07.9  Chest pain, unspecified  Marquez Contreras M.D., FACC,



       FASNC

 

 2019  J45.909  Unspecified asthma, uncomplicated  Aster Bateman MD

 

 10/23/2019  R07.9  Chest pain, unspecified  Marquez Contreras M.D., FACC,



       FASNC

 

 10/23/2019  I37.8  Other nonrheumatic pulmonary valve  Marquez Contreras M.D., 
[facility],



     disorders  FASNC

 

 10/09/2019  R07.9  Chest pain, unspecified  Marquez Contreras M.D., [facility],



       FASNC

 

 10/09/2019  R07.9  Chest pain, unspecified  Traveling ECHO 1

 

 2019  J45.20  Mild intermittent asthma,  Aster Bateman MD



     uncomplicated  







Plan of Treatment

Future Appointment(s):2020  9:00 am - Romana Hughes M.D. at [facility] 
Internal Medicine - [facility]2020  9:30 am - Veronica Hansen MD at [facility] 
Ntjkqvftnta98/03/2020 - Romana Hughes M.D.F41.9 Anxiety disorder, 
unspecifiedNew Medication:Venlafaxine HCL ER 75 mg - 1 by mouth every 
dayComments:Continue 75 mg venlafaxine for 2 weeksCall me at that pointYou 
could see a psychiatric NPFollow up:1 month



Functional Status







 Functional Condition  Comment  Date  Status

 

 Glasses      Active







Mental Status







 Description

 

 No Information Available







Referrals







 Refer to   Reason for Referral  Status  Appt Date

 

 Geneva Pandey MD    Sent  2020









 [address]

 

 [place] [address] [phone]

## 2020-03-14 NOTE — XMS REPORT
Continuity of Care Document (CCD)

 Created on:2020



Patient:Lindsey Shields

Sex:Female

:1960

External Reference #:[id number]





Demographics







 Address  [address]



   [place] [address]

 

 Home Phone  [phone]

 

 Mobile Phone  [phone]

 

 Work Phone  [phone]

 

 Email Address  [email or web address]

 

 Preferred Language  en

 

 Marital Status  Not  or 

 

 Shinto Affiliation  Unknown

 

 Race  White

 

 Ethnic Group  Not  or 









Author







 Name  Romana Hughes M.D. (transmitted by agent of provider Gaby Mirza)

 

 Address  [address], [address]



   [place] [address]









Care Team Providers







 Name  Role  Phone

 

 Romana Hughes MD - Internal  Care Team Information   [phone]
[phone]



 Medicine    

 

 Сергей Liu MD - Allergy &  Care Team Information   [phone]
[phone]



 Immunology    









Problems







 Active Problems  Provider  Date

 

 Asthma  Romana Hughes M.D.  Onset: 10/18/2016

 

 Mild intermittent asthma  Aster Bateman MD  Onset: 2017

 

 Chest pain  Marquez Contreras M.D., [facility], [facility]  Onset: 10/23/2019

 

 Pulmonary valve disorder  Marquez Contreras M.D., [facility], [facility]  Onset: 10/23/
2019







Social History







 Type  Date  Description  Comments

 

 Birth Sex    Unknown  

 

 Tobacco Use  Start: Unknown  Never Smoked Cigarettes  

 

 Smoking Status  Reviewed: 20  Never Smoked Cigarettes  

 

 ETOH Use    Currently consumes  about 2 drinks a



     alcohol  month

 

 Tobacco Use  Start: Unknown  Patient has never  



     smoked  

 

 Recreational Drug Use    Denies Drug Use  

 

 Exercise Type/Frequency    Exercises regularly  

 

 Exercise Type/Frequency    Walks daily  







Allergies, Adverse Reactions, Alerts







 Active Allergies  Reaction  Severity  Comments  Date

 

 Sulfa Antibiotics        2014

 

 Seasonal, Dust, Mold, Chicken, Tomatoes, Soy        2017

 

 Soy Proteins        2017







Medications







 Active Medications  SIG  Qnty  Indications  Ordering  Date



         Provider  

 

 Venlafaxine HCL ER  1 by mouth  30caps  F41.9  Romana  2020



                75mg  every day      ELIER Hughes  



 Caps ER 24HR          



           

 

 Prednisone  2 tabs daily  28units    Aster Bateman,  2019



        10mg (21) TBPK  for 7 days, 1      MD  



   tab daily for 2        



   weeks        

 

 Estradiol  1 tab in the am  8tabs    Marquez Spangler  10/23/2019



       0.5mg Tablets        ELIER Contreras,  



         [facility], [facility]  

 

 Levocetirizine  Take One Tablet  90tabs  L50.1  Romana2019



 Dihydrochloride  By Mouth Every      Cotton, M.D.  



             5mg Tablets  Day        



           

 

 Proair HFA  1-2 puffs by  8.500gm    Aster Bateman,  2016



        108(90Base)  mouth every 4      MD  



 mcg/Act Aerosol  hours as needed        



           

 

 Flovent HFA  inhale 1-2  12units    Aster Bateman,  



         110mcg/Act  puffs into      MD  



 Aerosol  lungs two times        



   a day        

 

 Famciclovir  1 by mouth  60tabs    Romana  



         250mg Tablets  twice a day as      Saul M.D.  



   needed        

 

 Melatonin Fast Meltz  prn      Unknown  



                  500mcg          



 Tablets Dispers          



           

 

 Mucinex  1 tab twice a      Unknown  



     600mg Tablets ER  day by mouth as        



 12HR  needed        

 

 Airborne Gummies  1 po qd      Unknown  



               Chewtabs          



           

 

 Montelukast Sodium  Take One Tablet  90tabs    Romana  



                10mg  By Mouth Every      Cotton, M.D.  



 Tablets  Day In The        



   Evening        

 

 Progesterone Micronized  take 1 capsule      Unknown  



   by mouth        



 100mg Capsules          



           

 

 Progesterone Micronized  one tab in the      Unknown  



   am        



 100mg Capsules          



           









 History Medications









 Venlafaxine HCL ER  take 1 by mouth  60caps  F41.9  Romana Saul,  2020 -



   every day x 2      M.D.  2020



 37.5mg Caps ER 24HR  weeks, then        



   increase to        



   2/daily        

 

 Doxycycline Hyclate  one tablet twice  14caps    Aster Bateman,  2019 -



   daily for 7      MD  2019



 100mg Capsules  days.        



           

 

 Azithromycin  take 2 tablets  6tabs    Aster Bateman,  2019 -



            250mg  by mouth today      MD  2019



 Tablets  then take 1        



   tablet daily for        



   4 days        







Medications Administered in Office







 Medication  SIG  Qnty  Indications  Ordering Provider  Date

 

 [facility] pharmacy administered        Unknown  2020



                 Injection          



           







Immunizations







 CPT Code  Status  Date  Vaccine  Lot #

 

 [id number]  Given  2019  Pneumonia Vaccine  

 

 [id number]  Given  10/23/2018  Influenza Virus Vaccine, Quadrivalent, Split,  



       Preservative Free  

 

 [id number]  Given  2017  Influenza Virus Vaccine, Quadrivalent, Split,  [id number]



       Preservative Free  

 

 50794  Given  10/18/2016  Pneumonia Vaccine  [id number]







Vital Signs







 Date  Vital  Result  Comment

 

 2020  9:03am  Height  64 inches  5'4"









 Weight  129.00 lb  

 

 Heart Rate  70 /min  

 

 BP Systolic  112 mmHg  

 

 BP Diastolic  75 mmHg  

 

 O2 % BldC Oximetry  96 %  

 

 BMI (Body Mass Index)  22.1 kg/m2  









 2020 10:31am  Height  64 inches  5'4"









 Weight  130.00 lb  

 

 Heart Rate  64 /min  

 

 BP Systolic  97 mmHg  

 

 BP Diastolic  66 mmHg  

 

 O2 % BldC Oximetry  96 %  

 

 BMI (Body Mass Index)  22.3 kg/m2  







Results







 Test  Acquired Date  Facility  Test  Result  H/L  Range  Note

 

 Lipid Profile  2020  [facility]  Triglycerides  77 mg/dL      
1



 (Trig/Chol/HDL)    [address]          



     [place] [address] [phone]          









 Cholesterol  252 mg/dL      2

 

 HDL Cholesterol  61.2 mg/dL      3

 

 LDL Cholesterol  175 mg/dL      4









 Laboratory  2020  [facility]  TSH (Thyroid  2.82  Normal  0.34
-5.60  



 test finding    [address]  Stim H[facility])  mcIU/mL      



     [place] [address] [phone]          









 Glucose  98 mg/dL  Normal    









 1  Desirable: <150



   Borderline High: 150-199



   High: 200-499



   Very High: >500

 

 2  Desirable: <200



   Borderline High: 200-239



   High: >239

 

 3  Low: <40



   Desirable: 40-60



   High: >60

 

 4  Desirable: <100



   Near Optimal: 100-129



   Borderline High: 130-159



   High: 160-189



   Very High: >189







Procedures







 Date  Code  Description  Status

 

 2019  47312  Stress Test  Completed

 

 2019  10720  Diffusing Capacity  Completed

 

 2019  87427  Plethysmography Determination Lung Volumes & Per  Completed



     Airway Resist  

 

 2019  81801  Pulmonary Function><Bronchodil  Completed

 

 10/23/2019  20176  EKG Tracing & Interpretation  Completed

 

 10/09/2019  82915  ECHO Transthoracic, Real-Time 2D With Doppler And  Completed



     Color Flow  

 

 10/09/2019  85088  ECHO Transthoracic, Real-Time 2D With Doppler And  Completed



     Color Flow  

 

 2019  21075698  Mammogram  Completed

 

 2018  26933357  Mammogram  Completed

 

 04/10/2017  89859499  Mammogram  Completed

 

 2016  932802409  Bone Mineral Density Test  Completed

 

 2016  62653311  Mammogram  Completed

 

 2015  71192580  Mammogram  Completed

 

 2014  08173384  Colonoscopy  Completed

 

 2009  46415081  Colonoscopy  Completed







Medical Devices







 Description

 

 No Information Available







Encounters







 Type  Date  Location  Provider  Dx  Diagnosis

 

 Office Visit  2020  [facility] Internal  Romana Hughes,  F41.9  Anxiety 
disorder,



   9:00a  Medicine Eric Avila M.D.    unspecified

 

 Office Visit  2020  [facility]  Romana Hughes,  Z00.00  Encntr for 
general



   10:20a  Rupinder Avila M.D.    adult medical exam



           w/o abnormal



           findings









 Z12.11  Encounter for screening for malignant neoplasm of colon

 

 F41.9  Anxiety disorder, unspecified

 

 Z12.31  Encntr screen mammogram for malignant neoplasm of breast









 Office Visit  10/23/2019 10:15a  [facility]  Marquez Spangler  R07.9  Chest 
pain,



     Of Liam Contreras M.D.,    unspecified



       FACC, FASNC    









 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









 Office Visit  2019  [facility]  J45.20  Mild intermittent



   10:45a  [facility]  MD Fransico    asthma,



     Cma      uncomplicated







Assessments







 Date  Code  Description  Provider

 

 2020  F41.9  Anxiety disorder, unspecified  Romana Hughes M.D.

 

 2020  Z00.00  Encounter for general adult medical  Romana Hughes M.D.



     examination without abnormal  



     findings  

 

 2020  Z12.11  Encounter for screening for  Romana Hughes M.D.



     malignant neoplasm of colon  

 

 2020  F41.9  Anxiety disorder, unspecified  Romana Hughes M.D.

 

 2020  Z12.31  Encounter for screening mammogram  Romana Hughes M.D.



     for malignant neoplasm of breast  

 

 2019  R07.9  Chest pain, unspecified  Marquez Contreras M.D., FACC,



       FASNC

 

 2019  J45.909  Unspecified asthma, uncomplicated  Aster Bateman MD

 

 10/23/2019  R07.9  Chest pain, unspecified  Marquez Contreras M.D., [facility],



       FASNC

 

 10/23/2019  I37.8  Other nonrheumatic pulmonary valve  Marquez Contreras M.D., 
[facility],



     disorders  [facility]

 

 10/09/2019  R07.9  Chest pain, unspecified  Marquez Contreras M.D., [facility],



       FASNC

 

 10/09/2019  R07.9  Chest pain, unspecified  [facility] E[facility]

 

 2019  J45.20  Mild intermittent asthma,  Aster Bateman MD



     uncomplicated  







Plan of Treatment

Future Appointment(s):2020  9:00 am - Romana Hughes M.D. at [facility] 
Internal Medicine - [facility]2020  9:30 am - Veronica Hansen MD at [facility] 
Mhjuacfqxdy26/03/2020 - Romana Hughes M.D.F41.9 Anxiety disorder, 
unspecifiedNew Medication:Venlafaxine HCL ER 75 mg - 1 by mouth every 
dayComments:Continue 75 mg venlafaxine for 2 weeksCall me at that pointYou 
could see a psychiatric NPFollow up:1 month



Functional Status







 Functional Condition  Comment  Date  Status

 

 Glasses      Active







Mental Status







 Description

 

 No Information Available







Referrals







 Refer to   Reason for Referral  Status  Appt Date

 

 Geneva Pandey MD    Sent  2020









 [address]

 

 [place] [address] [phone]

## 2020-03-14 NOTE — UC
Hand/Wrist HPI





- HPI Summary


HPI Summary: 


right hand bite by her dog tonight---l shaped laceration distal to right thumb  

full rom n/m/c intact distally---minimal amount of active bleeding








- History Of Current Complaint


Chief Complaint: UCBiteInjury


Stated Complaint: DOG BITE


Time Seen by Provider: 03/14/20 21:12


Hx Obtained From: Patient


Hx Last Menstrual Period: on control


Pregnant?: No


Mechanism Of Injury: dog bite


Onset/Duration: Sudden Onset


Pain Intensity: 8


Pain Scale Used: 0-10 Numeric


Character Of Pain: Throbbing


Aggravating Factor(s): Movement


Alleviating Factor(s): Nothing


Associated Signs And Symptoms: Positive: Redness


Related History: Dominant Hand Right





- Allergies/Home Medications


Allergies/Adverse Reactions: 


 Allergies











Allergy/AdvReac Type Severity Reaction Status Date / Time


 


levocetirizine [From Xyzal] Allergy  Rash Verified 03/14/20 20:15


 


Sulfa (Sulfonamide Allergy  Shortness Verified 12/01/19 14:39





Antibiotics)   of Breath  











Home Medications: 


 Home Medications





Levocetirizine Dihydrochloride [Xyzal] 5 mg PO QPM 05/03/13 [History Confirmed 

12/01/19]


Ventolin HFA Inhaler* 1 puff INH DAILY PRN 06/04/15 [History Confirmed 12/01/19]


Estrogen,Con/M-Progest Acet [Premphase 0.625-5 mg Tablet] 1 tab PO DAILY 02/25/ 18 [History Confirmed 12/01/19]


Montelukast Sodium TAB* [Singulair 10 MG TAB*] 10 mg PO DAILY 02/25/18 [History 

Confirmed 12/01/19]


Multivitamin [Animal Shapes Vitamins] 1 each PO DAILY 10/14/18 [History 

Confirmed 12/01/19]


Albuterol HFA INHALER* [Ventolin HFA Inhaler*] 1 - 2 puff INH Q4H PRN #1 mdi 12/ 01/19 [Rx]


Amoxicillin/Clavulanate TAB* [Augmentin *] 875 mg PO BID #19 tab 03/14/ 20 [Rx]











PMH/Surg Hx/FS Hx/Imm Hx


Previously Healthy: No


Respiratory History: Asthma





- Surgical History


Surgical History: Yes


Surgery Procedure, Year, and Place: appY.  csection x3.  em.  acl-left.  

TUBAL.  tonsilectomy





- Family History


Known Family History: Positive: Hypertension, Respiratory Disease





- Social History


Occupation: Employed Full-time


Lives: With Family


Alcohol Use: Weekly


Alcohol Amount: monthly


Substance Use Type: None


Smoking Status (MU): Never Smoked Tobacco





- Immunization History


Most Recent Influenza Vaccination: 2016


Most Recent Pneumonia Vaccination: 2016





Review of Systems


All Other Systems Reviewed And Are Negative: Yes


Constitutional: Positive: Negative


Skin: Positive: Other - l shaped skin tea jst distal to right thumb on back of 

hand


Eyes: Positive: Negative


ENT: Positive: Negative


Respiratory: Positive: Negative


Cardiovascular: Positive: Negative


Gastrointestinal: Positive: Negative


Genitourinary: Positive: Negative


Motor: Positive: Negative


Neurovascular: Positive: Negative


Musculoskeletal: Positive: Negative


Neurological/Mental Status: Positive: Negative


Psychological: Positive: Negative


Is Patient Immunocompromised?: No





Physical Exam


Triage Information Reviewed: Yes


Appearance: Well-Appearing, No Pain Distress, Well-Nourished


Vital Signs: 


 Initial Vital Signs











Temp  97.6 F   03/14/20 20:08


 


Pulse  86   03/14/20 20:08


 


Resp  16   03/14/20 20:08


 


BP  113/75   03/14/20 20:08


 


Pulse Ox  98   03/14/20 20:08











Vital Signs Reviewed: Yes


Eye Exam: Normal


Eyes: Positive: Conjunctiva Clear


ENT Exam: Normal


ENT: Positive: Normal ENT inspection, Hearing grossly normal.  Negative: Trismus

, Muffled voice, Hoarse voice


Dental Exam: Normal


Neck exam: Normal


Neck: Positive: Supple, Nontender, No Lymphadenopathy


Respiratory Exam: Normal


Respiratory: Positive: Chest non-tender, No respiratory distress, No accessory 

muscle use


Cardiovascular Exam: Normal


Cardiovascular: Positive: RRR, Pulses Normal, Brisk Capillary Refill


Musculoskeletal Exam: Normal


Musculoskeletal: Positive: Strength Intact, ROM Intact, No Edema


Neurological Exam: Normal


Neurological: Positive: Alert, Muscle Tone Normal


Psychological Exam: Normal


Skin: Positive: Other - 3 cm total length l shapped skin tear minimal amount of 

oozing





Procedures





- Laceration/Wound Repair


  ** 1


Location: upper extremity - right hand


Description: Irregular


Length, Depth and Shape: 3 cm total length, 1 mm deep


Betadine Prep?: No


Irrigated w/ Saline (ccs): 250


Laceration/Wound Explored: clean


Closure: SteriStrips





Hand/Wrist Course/Dx





- Course


Course Of Treatment: 


observe daily for infection keep dry for 48 hours than may shower but do not 

scrub wound 


 allow steri strips to fall off on their own---you may trim the edges as needed-


--follow with Dr. Schultz (hand surgeon) to assure proper healing. use splint 

for 10 days as not to put stress on healing wound








- Differential Dx/Diagnosis


Provider Diagnosis: 


 Dog bite of right hand without complication








Discharge ED





- Sign-Out/Discharge


Documenting (check all that apply): Patient Departure


All imaging exams completed and their final reports reviewed: No Studies





- Discharge Plan


Condition: Stable


Disposition: HOME


Prescriptions: 


Amoxicillin/Clavulanate TAB* [Augmentin *] 875 mg PO BID #19 tab


Patient Education Materials:  Animal Bite (ED), Steristrips (ED)


Referrals: 


Mariela Schultz MD [Medical Doctor] - 3 Days





- Billing Disposition and Condition


Condition: STABLE


Disposition: Home

## 2020-03-14 NOTE — XMS REPORT
Continuity of Care Document (CCD)

 Created on:2020



Patient:Lindsey Shields

Sex:Female

:1960

External Reference #:[id number]





Demographics







 Address  [address]



   [place] [id number]

 

 Home Phone  [phone]

 

 Mobile Phone  [phone]

 

 Work Phone  [phone]

 

 Email Address  [email or web address]

 

 Preferred Language  en

 

 Marital Status  Not  or 

 

 Yazdanism Affiliation  Unknown

 

 Race  White

 

 Ethnic Group  Not  or 









Author







 Name  Romana Hughes M.D. (transmitted by agent of provider Trudi Leyva)

 

 Address  [address] , [address]



   [place] [address]









Care Team Providers







 Name  Role  Phone

 

 Romana Hughes MD - Internal  Care Team Information   [phone]
[phone]



 Medicine    

 

 Сергей Liu MD - Allergy &  Care Team Information   [phone]
[phone]



 Immunology    









Problems







 Active Problems  Provider  Date

 

 Asthma  Romana Hughes M.D.  Onset: 10/18/2016

 

 Mild intermittent asthma  Aster Bateman MD  Onset: 2017

 

 Chest pain  Marquez Contreras M.D., [facility]  Onset: 10/23/2019

 

 Pulmonary valve disorder  Marquez Contreras M.D., [facility]  Onset: 10/23/
2019







Social History







 Type  Date  Description  Comments

 

 Birth Sex    Unknown  

 

 Tobacco Use  Start: Unknown  Never Smoked Cigarettes  

 

 Smoking Status  Reviewed: 20  Never Smoked Cigarettes  

 

 ETOH Use    Currently consumes  about 2 drinks a



     alcohol  month

 

 Tobacco Use  Start: Unknown  Patient has never  



     smoked  

 

 Recreational Drug Use    Denies Drug Use  

 

 Exercise Type/Frequency    Exercises regularly  

 

 Exercise Type/Frequency    Walks daily  







Allergies, Adverse Reactions, Alerts







 Active Allergies  Reaction  Severity  Comments  Date

 

 Sulfa Antibiotics        2014

 

 Seasonal, Dust, Mold, Chicken, Tomatoes, Soy        2017

 

 Soy Proteins        2017







Medications







 Active Medications  SIG  Qnty  Indications  Ordering  Date



         Provider  

 

 Venlafaxine HCL ER  take 1 by mouth  60caps  F41.9  Romana  2020



                37.5mg  every day x 2      ELIER Hughes  



 Caps ER 24HR  weeks, then        



   increase to        



   2/daily        

 

 Prednisone  2 tabs daily  28units    Aster Bateman,  2019



        10mg (21) TBPK  for 7 days, 1      MD  



   tab daily for 2        



   weeks        

 

 Estradiol  1 tab in the am  8tabs    Marquez Spangler  10/23/2019



       0.5mg Tablets        ELIER Contreras,  



         [facility]  

 

 Levocetirizine  Take One Tablet  90tabs  L50.1  Romana  2019



 Dihydrochloride  By Mouth Every      Cotton, M.D.  



             5mg Tablets  Day        



           

 

 Proair HFA  1-2 puffs by  8.500gm    Aster Bateman,  2016



        108(90Base)  mouth every 4      MD  



 mcg/Act Aerosol  hours as needed        



           

 

 Flovent HFA  inhale 1-2  12units    Aster Bateman,  



         110mcg/Act  puffs into      MD  



 Aerosol  lungs two times        



   a day        

 

 Famciclovir  1 by mouth  60tabs    Romana  



         250mg Tablets  twice a day as      Cotton, M.D.  



   needed        

 

 Melatonin Fast Meltz  prn      Unknown  



                  500mcg          



 Tablets Dispers          



           

 

 Mucinex  1 tab twice a      Unknown  



     600mg Tablets ER  day by mouth as        



 12HR  needed        

 

 Airborne Gummies  1 po qd      Unknown  



               Chewtabs          



           

 

 Montelukast Sodium  Take One Tablet  90tabs    Romana  



                10mg  By Mouth Every      Cotton, M.D.  



 Tablets  Day In The        



   Evening        

 

 Progesterone Micronized  take 1 capsule      Unknown  



   by mouth        



 100mg Capsules          



           

 

 Progesterone Micronized  one tab in the      Unknown  



   am        



 100mg Capsules          



           









 History Medications









 Doxycycline Hyclate  one tablet twice  14caps    Aster Bateman,  2019 -



                  100mg  daily for 7 days.      MD  2019



 Capsules          



           

 

 Azithromycin  take 2 tablets by  6tabs    Aster Bateman,  2019 -



           250mg  mouth today then      MD  2019



 Tablets  take 1 tablet        



   daily for 4 days        







Immunizations







 CPT Code  Status  Date  Vaccine  Lot #

 

 [id number]  Given  2019  Pneumonia Vaccine  

 

 54640  Given  10/23/2018  Influenza Virus Vaccine, Quadrivalent, Split,  



       Preservative Free  

 

 33756  Given  2017  Influenza Virus Vaccine, Quadrivalent, Split,  [id number]



       Preservative Free  

 

 43415  Given  10/18/2016  Pneumonia Vaccine  [id number]







Vital Signs







 Date  Vital  Result  Comment

 

 2020 10:31am  Height  64 inches  5'4"









 Weight  130.00 lb  

 

 Heart Rate  64 /min  

 

 BP Systolic  97 mmHg  

 

 BP Diastolic  66 mmHg  

 

 O2 % BldC Oximetry  96 %  

 

 BMI (Body Mass Index)  22.3 kg/m2  









 10/23/2019  9:58am  Height  64 inches  5'4"









 Weight  132.00 lb  with shoes

 

 Heart Rate  60 /min  

 

 BP Systolic Sitting  120 mmHg  LA

 

 BP Diastolic Sitting  90 mmHg  LA

 

 BP Systolic Standing  122 mmHg  LA

 

 BP Diastolic Standing  90 mmHg  LA

 

 BMI (Body Mass Index)  22.7 kg/m2  

 

 Ejection Fraction  55-60%  date 10/09/19 echo







Results







 Test  Acquired Date  Facility  Test  Result  H/L  Range  Note

 

 Lipid Profile  2020  [facility]  Triglycerides  77 mg/dL      
1



 (Trig/Chol/HDL)    [address]          



     [place] [address] [phone]          









 Cholesterol  252 mg/dL      2

 

 HDL Cholesterol  61.2 mg/dL      3

 

 LDL Cholesterol  175 mg/dL      4









 Laboratory  2020  [facility]  TSH (Thyroid  2.82  Normal  0.34
-5.60  



 test finding    [address]  [facility] [address])  mcIU/mL      



     [place] [address] [phone]          









 Glucose  98 mg/dL  Normal    









 1  Desirable: <150



   Borderline High: 150-199



   High: 200-499



   Very High: >500

 

 2  Desirable: <200



   Borderline High: 200-239



   High: >239

 

 3  Low: <40



   Desirable: 40-60



   High: >60

 

 4  Desirable: <100



   Near Optimal: 100-129



   Borderline High: 130-159



   High: 160-189



   Very High: >189







Procedures







 Date  Code  Description  Status

 

 2019  73236  Stress Test  Completed

 

 2019  96609  Diffusing Capacity  Completed

 

 2019  79804  Plethysmography Determination Lung Volumes & Per  Completed



     Airway Resist  

 

 2019  90268  Pulmonary Function><Bronchodil  Completed

 

 10/23/2019  94216  EKG Tracing & Interpretation  Completed

 

 10/09/2019  17659  ECHO Transthoracic, Real-Time 2D With Doppler And  Completed



     Color Flow  

 

 10/09/2019  30154  ECHO Transthoracic, Real-Time 2D With Doppler And  Completed



     Color Flow  

 

 2019  41710216  Mammogram  Completed

 

 2018  53972184  Mammogram  Completed

 

 04/10/2017  07970789  Mammogram  Completed

 

 2016  414000061  Bone Mineral Density Test  Completed

 

 2016  73112250  Mammogram  Completed

 

 2015  98593580  Mammogram  Completed

 

 2014  74102889  Colonoscopy  Completed

 

 2009  82302214  Colonoscopy  Completed







Medical Devices







 Description

 

 No Information Available







Encounters







 Type  Date  Location  Provider  Dx  Diagnosis

 

 Office Visit  10/23/2019  [facility]  Marquez Spangler  R07.9  Chest pain,



   10:15a  Of Liam Contreras M.D.,    unspecified



       FACC, FASNC    









 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









 Office Visit  2019  [facility]  Aster  J45.20  Mild intermittent



   10:45a  [facility]    asthma,



     [facility]      uncomplicated







Assessments







 Date  Code  Description  Provider

 

 2020  Z00.00  Encounter for general adult medical  Romana Hughes M.D.



     examination without abnormal  



     findings  

 

 2020  Z12.11  Encounter for screening for  Romana Hughes M.D.



     malignant neoplasm of colon  

 

 2020  F41.9  Anxiety disorder, unspecified  Romana Hughes M.D.

 

 2020  Z12.31  Encounter for screening mammogram  Romana Hughes M.D.



     for malignant neoplasm of breast  

 

 2019  R07.9  Chest pain, unspecified  Marquez Contreras M.D., FACC,



       FASNC

 

 2019  J45.909  Unspecified asthma, uncomplicated  Aster Bateman MD

 

 10/23/2019  R07.9  Chest pain, unspecified  Marquez Contreras M.D., FACC,



       FASNC

 

 10/23/2019  I37.8  Other nonrheumatic pulmonary valve  Marquez Contreras M.D., 
FACC,



     disorders  FASNC

 

 10/09/2019  R07.9  Chest pain, unspecified  Marquez Contreras M.D., FACC,



       FASNC

 

 10/09/2019  R07.9  Chest pain, unspecified  Traveling ECHO 1

 

 2019  J45.20  Mild intermittent asthma,  Aster Bateman MD



     uncomplicated  







Plan of Treatment

Future Appointment(s):2020 10:40 am - Romana Hughes M.D. at [facility] 
Internal Medicine - Ccmob2020  9:30 am - Veronica Hansen MD at [facility] 
Bgxcwtuaqrx59/17/2020 - Romana Hughes M.D.Z00.00 Encounter for general 
adult medical examination without abnormal findingsComments:VACCINES:Flu shot 
every year in the fall.Tetanus: none on filePneumonia vaccine: done in 2016, 
booster at age 65Shingles vaccine: there are 2 shingles vaccines: - Zostavax - 
50% effective, recommended at age 60 - Shingrix - 90% effective, recommended at 
age 50. This is a new shingles vaccine,  available at pharmacies. Series of 2 
shots, given 2-6 months apart. Most people get a flu-like reaction. Cost is 
about $400 - call your insurance about coverage. Not widely available - talk to 
your pharmacist about their waiting list  SCREENING:Colonoscopy: last one done 
in , follow up recommended at 5-6 yearsMammogram: done 19Pap smear: 
with GYN, done in 2019Skin check: with Dr. Landaverdeolesterol: just checked. 
HDL ("good cholesterol") is high which is good, LDL is high which is not 
optimal but overall risk is very low - 2.4% 10 year risk of heart disease/
tuuccmU00.11 Encounter for screening for malignant neoplasm of colonReferral:Geneva Pandey MD, ApeyjdahxmnbrdaeT23.9 Anxiety disorder, unspecifiedNew 
Medication:Venlafaxine HCL ER 37.5 mg - take 1 by mouth every day x 2 weeks, 
then increase to 2/dailyFollow up:4-6 KFNCZU59.31 Encounter for screening 
mammogram for malignant neoplasm of breast



Functional Status







 Functional Condition  Comment  Date  Status

 

 Glasses      Active







Mental Status







 Description

 

 No Information Available







Referrals







 Refer to   Reason for Referral  Status  Appt Date

 

 Geneva Pandey MD    Created  [address]

 

 [place] [address]

 

 [phone]